# Patient Record
Sex: MALE | Race: WHITE | NOT HISPANIC OR LATINO | Employment: OTHER | URBAN - NONMETROPOLITAN AREA
[De-identification: names, ages, dates, MRNs, and addresses within clinical notes are randomized per-mention and may not be internally consistent; named-entity substitution may affect disease eponyms.]

---

## 2017-04-11 ENCOUNTER — COMMUNICATION - GICH (OUTPATIENT)
Dept: FAMILY MEDICINE | Facility: OTHER | Age: 72
End: 2017-04-11

## 2017-04-11 DIAGNOSIS — I10 ESSENTIAL (PRIMARY) HYPERTENSION: ICD-10-CM

## 2017-05-31 ENCOUNTER — COMMUNICATION - GICH (OUTPATIENT)
Dept: FAMILY MEDICINE | Facility: OTHER | Age: 72
End: 2017-05-31

## 2017-05-31 DIAGNOSIS — F41.9 ANXIETY DISORDER: ICD-10-CM

## 2017-07-10 ENCOUNTER — APPOINTMENT (OUTPATIENT)
Age: 72
Setting detail: DERMATOLOGY
End: 2017-07-12

## 2017-07-10 PROBLEM — C44.311 BASAL CELL CARCINOMA OF SKIN OF NOSE: Status: ACTIVE | Noted: 2017-07-10

## 2017-07-10 PROCEDURE — OTHER MOHS SURGERY PHONE CONSULTATION: OTHER

## 2017-07-10 NOTE — HPI: MOHS SURGERY CONSULTATION
Additional History: Patient will be accompanied by his wife Martine.  Patient has a limp but does not require an assisted walking device.

## 2017-07-10 NOTE — PROCEDURE: MOHS SURGERY PHONE CONSULTATION
Has The Patient Ever Received A Transplant?: No
Has The Pathology Report Been Received?: Yes
Time Of Mohs Surgery: 0900
Date Of Mohs Surgery: 07/11/2017
Office Location Of Mohs Surgery: [facility]
Patient Reported Location: right side of nose
Referring Provider: Dr. Heidi Garcia MD
Detail Level: Detailed

## 2017-07-11 ENCOUNTER — APPOINTMENT (OUTPATIENT)
Age: 72
Setting detail: DERMATOLOGY
End: 2017-07-12

## 2017-07-11 PROBLEM — C44.311 BASAL CELL CARCINOMA OF SKIN OF NOSE: Status: ACTIVE | Noted: 2017-07-11

## 2017-07-11 PROCEDURE — OTHER CONSULTATION FOR MOHS SURGERY: OTHER

## 2017-07-11 PROCEDURE — OTHER MIPS QUALITY: OTHER

## 2017-07-11 PROCEDURE — OTHER MOHS SURGERY: OTHER

## 2017-07-11 PROCEDURE — 17311 MOHS 1 STAGE H/N/HF/G: CPT

## 2017-07-11 NOTE — PROCEDURE: CONSULTATION FOR MOHS SURGERY
Body Location Override (Optional - Billing Will Still Be Based On Selected Body Map Location If Applicable): Right Nasal Ala
X Size Of Lesion In Cm (Optional): 0.5
Name Of The Referring Provider For Procedure: Heidi Garcia MD
Detail Level: Detailed
Incorporate Mauc In Note: Yes
Location Indication Override (Is Already Calculated Based On Selected Body Location): Area H

## 2017-07-11 NOTE — PROCEDURE: MOHS SURGERY
Post-Care Instructions: The patient was provided with detailed verbal and written instructions for daily wound care, including use of H2O2 cleansing, followed by application of plain Vaseline and a bandage.  These instructions including Dr. Obregon's contact information should there be any questions or concerns.  The patient is not to engage in any heavy lifting, exercise, or swimming for the next week.  Should the patient develop any fevers, chills, bleeding, or pain not controlled by OTC analgesics, s/he should contact the office immediately.

## 2017-07-11 NOTE — PROCEDURE: MOHS SURGERY
Body Location Override (Optional - Billing Will Still Be Based On Selected Body Map Location If Applicable): Right Nasal Ala

## 2017-07-11 NOTE — PROCEDURE: MIPS QUALITY
Quality 130: Documentation Of Current Medications In The Medical Record: Current Medications Documented
Detail Level: Detailed
Quality 143: Oncology: Medical And Radiation- Pain Intensity Quantified: Pain severity quantified, no pain present
Quality 226: Preventive Care And Screening: Tobacco Use: Screening And Cessation Intervention: Patient screened for tobacco and is an ex-smoker
Quality 110: Preventive Care And Screening: Influenza Immunization: Influenza Immunization Ordered or Recommended, but not Administered
Quality 110: Preventive Care And Screening: Influenza Immunization: Influenza Immunization Administered during Influenza season
Quality 431: Preventive Care And Screening: Unhealthy Alcohol Use - Screening: Patient screened for unhealthy alcohol use using a single question and scores less than 2 times per year

## 2017-07-15 ENCOUNTER — COMMUNICATION - GICH (OUTPATIENT)
Dept: FAMILY MEDICINE | Facility: OTHER | Age: 72
End: 2017-07-15

## 2017-07-15 DIAGNOSIS — I10 ESSENTIAL (PRIMARY) HYPERTENSION: ICD-10-CM

## 2017-08-17 ENCOUNTER — COMMUNICATION - GICH (OUTPATIENT)
Dept: FAMILY MEDICINE | Facility: OTHER | Age: 72
End: 2017-08-17

## 2017-08-17 DIAGNOSIS — I10 ESSENTIAL (PRIMARY) HYPERTENSION: ICD-10-CM

## 2017-08-27 ENCOUNTER — COMMUNICATION - GICH (OUTPATIENT)
Dept: FAMILY MEDICINE | Facility: OTHER | Age: 72
End: 2017-08-27

## 2017-08-27 DIAGNOSIS — F41.9 ANXIETY DISORDER: ICD-10-CM

## 2017-10-03 ENCOUNTER — HISTORY (OUTPATIENT)
Dept: FAMILY MEDICINE | Facility: OTHER | Age: 72
End: 2017-10-03

## 2017-10-03 ENCOUNTER — OFFICE VISIT - GICH (OUTPATIENT)
Dept: FAMILY MEDICINE | Facility: OTHER | Age: 72
End: 2017-10-03

## 2017-10-03 DIAGNOSIS — A80.9 ACUTE POLIOMYELITIS: ICD-10-CM

## 2017-10-03 DIAGNOSIS — I10 ESSENTIAL (PRIMARY) HYPERTENSION: ICD-10-CM

## 2017-10-03 DIAGNOSIS — Z23 ENCOUNTER FOR IMMUNIZATION: ICD-10-CM

## 2017-10-03 DIAGNOSIS — D12.6 BENIGN NEOPLASM OF COLON: ICD-10-CM

## 2017-10-03 DIAGNOSIS — F41.9 ANXIETY DISORDER: ICD-10-CM

## 2017-10-03 DIAGNOSIS — I49.9 CARDIAC ARRHYTHMIA: ICD-10-CM

## 2017-10-03 ASSESSMENT — ANXIETY QUESTIONNAIRES
5. BEING SO RESTLESS THAT IT IS HARD TO SIT STILL: NOT AT ALL
6. BECOMING EASILY ANNOYED OR IRRITABLE: NOT AT ALL
4. TROUBLE RELAXING: NOT AT ALL
7. FEELING AFRAID AS IF SOMETHING AWFUL MIGHT HAPPEN: NOT AT ALL
GAD7 TOTAL SCORE: 0
1. FEELING NERVOUS, ANXIOUS, OR ON EDGE: NOT AT ALL
3. WORRYING TOO MUCH ABOUT DIFFERENT THINGS: NOT AT ALL
2. NOT BEING ABLE TO STOP OR CONTROL WORRYING: NOT AT ALL

## 2017-10-04 ENCOUNTER — AMBULATORY - GICH (OUTPATIENT)
Dept: LAB | Facility: OTHER | Age: 72
End: 2017-10-04

## 2017-10-04 DIAGNOSIS — D12.6 BENIGN NEOPLASM OF COLON: ICD-10-CM

## 2017-10-04 DIAGNOSIS — I49.9 CARDIAC ARRHYTHMIA: ICD-10-CM

## 2017-10-04 DIAGNOSIS — I10 ESSENTIAL (PRIMARY) HYPERTENSION: ICD-10-CM

## 2017-10-04 LAB
A/G RATIO - HISTORICAL: 1.7 (ref 1–2)
ABSOLUTE BASOPHILS - HISTORICAL: 0.1 THOU/CU MM
ABSOLUTE EOSINOPHILS - HISTORICAL: 0.2 THOU/CU MM
ABSOLUTE IMMATURE GRANULOCYTES(METAS,MYELOS,PROS) - HISTORICAL: 0 THOU/CU MM
ABSOLUTE LYMPHOCYTES - HISTORICAL: 1.8 THOU/CU MM (ref 0.9–2.9)
ABSOLUTE MONOCYTES - HISTORICAL: 0.6 THOU/CU MM
ABSOLUTE NEUTROPHILS - HISTORICAL: 5.9 THOU/CU MM (ref 1.7–7)
ALBUMIN SERPL-MCNC: 4.1 G/DL (ref 3.5–5.7)
ALP SERPL-CCNC: 64 IU/L (ref 34–104)
ALT (SGPT) - HISTORICAL: 11 IU/L (ref 7–52)
ANION GAP - HISTORICAL: 9 (ref 5–18)
AST SERPL-CCNC: 16 IU/L (ref 13–39)
BASOPHILS # BLD AUTO: 0.7 %
BILIRUB SERPL-MCNC: 0.7 MG/DL (ref 0.3–1)
BILIRUB UR QL: NEGATIVE
BUN SERPL-MCNC: 19 MG/DL (ref 7–25)
BUN/CREAT RATIO - HISTORICAL: 20
CALCIUM SERPL-MCNC: 9.1 MG/DL (ref 8.6–10.3)
CHLORIDE SERPLBLD-SCNC: 105 MMOL/L (ref 98–107)
CHOL/HDL RATIO - HISTORICAL: 3.7
CHOLESTEROL TOTAL: 196 MG/DL
CLARITY, URINE: CLEAR CLARITY
CO2 SERPL-SCNC: 25 MMOL/L (ref 21–31)
COLOR UR: YELLOW COLOR
CREAT SERPL-MCNC: 0.94 MG/DL (ref 0.7–1.3)
EOSINOPHIL NFR BLD AUTO: 1.9 %
ERYTHROCYTE [DISTWIDTH] IN BLOOD BY AUTOMATED COUNT: 12.8 % (ref 11.5–15.5)
GFR IF NOT AFRICAN AMERICAN - HISTORICAL: >60 ML/MIN/1.73M2
GLOBULIN - HISTORICAL: 2.4 G/DL (ref 2–3.7)
GLUCOSE SERPL-MCNC: 114 MG/DL (ref 70–105)
GLUCOSE URINE: NEGATIVE MG/DL
HCT VFR BLD AUTO: 42.4 % (ref 37–53)
HDLC SERPL-MCNC: 53 MG/DL (ref 23–92)
HEMOGLOBIN: 14.7 G/DL (ref 13.5–17.5)
IMMATURE GRANULOCYTES(METAS,MYELOS,PROS) - HISTORICAL: 0.4 %
KETONES UR QL: NEGATIVE MG/DL
LDLC SERPL CALC-MCNC: 106 MG/DL
LEUKOCYTE ESTERASE URINE: NEGATIVE
LYMPHOCYTES NFR BLD AUTO: 20.8 % (ref 20–44)
MAGNESIUM SERPL-MCNC: 1.9 MG/DL (ref 1.9–2.7)
MCH RBC QN AUTO: 30.4 PG (ref 26–34)
MCHC RBC AUTO-ENTMCNC: 34.7 G/DL (ref 32–36)
MCV RBC AUTO: 88 FL (ref 80–100)
MONOCYTES NFR BLD AUTO: 6.9 %
NEUTROPHILS NFR BLD AUTO: 69.3 % (ref 42–72)
NITRITE UR QL STRIP: NEGATIVE
NON-HDL CHOLESTEROL - HISTORICAL: 143 MG/DL
OCCULT BLOOD,URINE - HISTORICAL: NEGATIVE
PH UR: 5 [PH]
PLATELET # BLD AUTO: 189 THOU/CU MM (ref 140–440)
PMV BLD: 10 FL (ref 6.5–11)
POTASSIUM SERPL-SCNC: 4 MMOL/L (ref 3.5–5.1)
PROT SERPL-MCNC: 6.5 G/DL (ref 6.4–8.9)
PROTEIN QUALITATIVE,URINE - HISTORICAL: NEGATIVE MG/DL
PROVIDER ORDERDED STATUS - HISTORICAL: ABNORMAL
RED BLOOD COUNT - HISTORICAL: 4.83 MIL/CU MM (ref 4.3–5.9)
SODIUM SERPL-SCNC: 139 MMOL/L (ref 133–143)
SP GR UR STRIP: >=1.03
T4 FREE SERPL-MCNC: 0.83 NG/DL (ref 0.58–1.64)
TRIGL SERPL-MCNC: 186 MG/DL
TSH - HISTORICAL: 1.53 UIU/ML (ref 0.34–5.6)
UROBILINOGEN,QUALITATIVE - HISTORICAL: NORMAL EU/DL
WHITE BLOOD COUNT - HISTORICAL: 8.5 THOU/CU MM (ref 4.5–11)

## 2017-10-06 ENCOUNTER — AMBULATORY - GICH (OUTPATIENT)
Dept: FAMILY MEDICINE | Facility: OTHER | Age: 72
End: 2017-10-06

## 2017-12-01 ENCOUNTER — COMMUNICATION - GICH (OUTPATIENT)
Dept: FAMILY MEDICINE | Facility: OTHER | Age: 72
End: 2017-12-01

## 2017-12-01 DIAGNOSIS — I10 ESSENTIAL (PRIMARY) HYPERTENSION: ICD-10-CM

## 2017-12-27 NOTE — PROGRESS NOTES
Patient Information     Patient Name MRN Mayo Desai [id number] Male 1945      Progress Notes by Zeyad Sorensen MD at 10/3/2017  3:45 PM     Author:  Zeyad Sorensen MD Service:  (none) Author Type:  Physician     Filed:  10/5/2017 12:19 PM Encounter Date:  10/3/2017 Status:  Signed     :  Zeyad Sorensen MD (Physician)            HPI-In today for comprehensive evaluation.  He's been feeling fine. He has no new problems or concerns. Medications remain the same. He denies any particular problems or issues.    Past Medical History:     Diagnosis  Date     Alcohol addiction (HC)     dry for two and a half years in 2004      Anxiety      Basal cell cancer 2015     Decreased libido      Depression      Diverticulosis of sigmoid colon 3/23/2015     POLIOMYELITIS      Prostatism     Mild prostatism      Past Surgical History:      Procedure  Laterality Date     COLONOSCOPY DIAGNOSTIC  3/23/15    F/U 12 months       COLONOSCOPY DIAGNOSTIC  16    F/U 2019       COLONOSCOPY SCREENING  2003    normal       LEG/ANKLE SURGERY      Right leg surgery for polio       [place] SHOULDER ARTHROS[place] SURGERY Bilateral 10/2013    Rotator cuff--had left shoulder done        WRIST FRACTURE [place]      ORIF of wrist fracture       Family History       Problem   Relation Age of Onset     Arthritis  Mother      79, degenerative arthritis of the hips.       Heart Disease  Father       at age 66 of peripheral vascular disease with BK amputations bilaterally.  He was not diabetic, but had high cholesterol and was a smoker.        Good Health  Other      13 siblings, all A & W.  Several treated for depression.       Cancer-breast  Sister      Heart Disease  Brother      Age 52, had MI       Social History      Substance Use Topics        Smoking status:  Former Smoker     Types: Cigarettes     Smokeless tobacco:  Never Used     Alcohol use  No     Allergies      Allergen    Reactions     Banana  *Unknown     Bee Sting [Venom-Honey Bee]  *Unknown     Hazelnuts [Tree Nut]  *Unknown     Kiwi  *Unknown     Peach (Prunus Persica)  *Unknown     Can't eat fresh peach          Current Outpatient Prescriptions:      buPROPion (WELLBUTRIN XL) 300 mg Extended-Release tablet, Take 1 tablet by mouth once daily., Disp: 90 tablet, Rfl: 4     GLUC HCL/GLUC SALDIVAR/[id number]-CJV-Z-GLUC (GLUCOSAMINE COMPLEX ORAL), Take 1 Tab by mouth., Disp: , Rfl:      lisinopril-hydrochlorothiazide (10-12.5 mg) tablet (PRINZIDE; ZESTORETIC), TAKE 1 TABLET BY MOUTH EVERY DAY, Disp: 90 tablet, Rfl: 0     Multivits with Min-FA-Lycopene (ONE-A-DAY MENS) 0.4-600 mg-mcg Tab, Take 1 tablet by mouth once daily., Disp: , Rfl: 0  Medications have been reviewed by me and are current to the best of my knowledge and ability.      ROS:  See HPI:  No recent weight change, fever, chills, or night sweats. Eyes, ENT, cardiopulmonary, GI, , rheumatologic, hematologic, skin, lymph, neurologic, psychiatric and endocrine are all negative. He's having no trouble with his legs from his bout with polio when he was a child.    Physical exam-he is a pleasant, cooperative,  72 y.o. male   in no apparent distress.  HEENT-within normal limits for age.  Neck-supple with no adenopathy, thyromegaly, or bruits.  Good carotid pulses.  Lungs-clear with good air movement.  No rales, rhonchi, or wheezes are heard  Heart-regular rhythm with no murmur or gallop appreciated. He does have quite frequent ectopies.  Abdomen-soft and nontender with no organomegaly or masses, bowel sounds are normal.  No bruits are heard.  Back-no CVA or low back tenderness.  -normal penis and testicles.  No hernia.  No lesions are seen.  Rectal-normal sphincter tone. Empty rectal vault. Normal size prostate with no nodules, tenderness, or asymmetry.  Extremities-no cyanosis, clubbing, or edema.  Normal distal pulses. Mild atrophy is like from polio which is unchanged.  Skin-no  significant lesions or rashes are noted.  Lymphatic-no abnormal nodes are noted   Neurologic-intact and nonfocal.  Psychiatric-alert and oriented.  Normal mood and affect.    Lab-pending  Imaging-no imaging. An EKG reveals a sinus rhythm with frequent PVCs, no acute change    Assessment-preventive healthcare-normal exam. Colonoscopy is up-to-date. Immunizations are up-to-date. Flu vaccine is recommended and given.    Cardiac arrhythmia with PVCs. These are asymptomatic. He is not having any problems on suggested we just observe and follow up again in a month.    Hypertension with good control    History of polio with no significant problems    Anxiety-stable    Plan-laboratory studies done, he'll be notified of the result. We'll plan to follow-up again for the PVCs in one month. He is encouraged to call or return if he develops any symptoms such as chest pain, shortness of breath, palpitations, or syncope.    Zeyad Sorensen MD ....................  10/5/2017   12:14 PM

## 2017-12-28 NOTE — TELEPHONE ENCOUNTER
Patient Information     Patient Name MRN Sex Mayo Garcia [id number] Male 1945      Telephone Encounter by Marnie Parsons RN at 2017 11:19 AM     Author:  Marnie Parsons RN Service:  (none) Author Type:  NURS- Registered Nurse     Filed:  2017 11:27 AM Encounter Date:  2017 Status:  Signed     :  Marnie Parsons RN (NURS- Registered Nurse)            Depression-in adults 18 and over  Office visit in the past 12 months or as indicated in chart.  Should have clinic visit 1-2 months after initial prescription.  Last visit with VANGIE PRATER was on: 2016 - Dx reviewed at this OV.   Next visit with VANGIE PRATER is on: No future appointment listed with this provider  Next visit with Family Practice is on: No future appointment listed in this department  Max refills 12 months from last office visit or per providers notes.  Prescription refilled per RN Medication Refill Policy.................... Marnie Parsons RN ....................  2017   11:26 AM

## 2017-12-28 NOTE — TELEPHONE ENCOUNTER
Patient Information     Patient Name MRN Sex Mayo Garcia [id number] Male 1945      Telephone Encounter by Talia Reynoso RN at 2017  2:18 PM     Author:  Talia Reynoso RN Service:  (none) Author Type:  NURS- Registered Nurse     Filed:  2017  2:24 PM Encounter Date:  7/15/2017 Status:  Signed     :  Talia Reynoso RN (NURS- Registered Nurse)            Diuretic Combinations    Office visit in the past 12 months or per provider note.    Last visit with VANGIE PRATER was on: 2016 in [facility]  Next visit with VANGIE PRATER is on: No future appointment listed with this provider  Next visit with Family Practice is on: No future appointment listed in this department    Lab test requirements:  Creatinine and Potassium annually, if ordering lab, order BMP.  CREATININE (mg/dL)    Date Value   2016 0.97     POTASSIUM (mmol/L)    Date Value   2016 4.0       Max refill for 12 months from last office visit or per provider note.    Overdue for physical.  Phone call to patient with no answer.    Will give one month refill, and send letter (has been sent one letter).  Prescription refilled per RN Medication Refill Policy.................... TALIA REYNOSO RN ....................  2017   2:20 PM

## 2017-12-28 NOTE — PROGRESS NOTES
Patient Information     Patient Name MRN Sex Mayo Burger [id number] Male 1945      Progress Notes signed by Zeyad Sorensen MD at 10/6/2017 11:49 AM      Author:  Zeyad Sorensen MD Service:  (none) Author Type:  Physician     Filed:  10/6/2017 11:49 AM Encounter Date:  10/6/2017 Status:  Signed     :  Zeyad Sorensen MD (Physician)            2017      MAYO MADERA  [address] [place] [id number]      RE: MAYO MADERA  MR#: [id number]  : 1945        Dear Mr. Madera:    I am enclosing a copy of your blood profile.  As you can see, these tests are all normal expect for just a minimal elevation of your blood sugar.  Your blood sugar always runs borderline high and this is about the same as it has been over the last 3 years.      Your cholesterol is doing fine, down from 202 last year.  The minimal elevation of the triglycerides and LDL cholesterol are not at all significant.      We also checked all your electrolytes, magnesium, and thyroid tests because of the extra heartbeats that you showed when we did your EKG.  These were all normal.  Your urine test was normal as well.     As we discussed, we should plan to just see you back in a month or so to recheck the heart rhythm.  At this point, there is nothing that indicates any significant problem.     Sincerely yours,       MD SHARRI ROSARIO/debra  D:10/06/2017 08:04:45  T:10/06/2017 08:41:33  VJID: [id number]  TJID: [id number]    cc:  Enclosure(s):Comp metabolic panel and lipid panel done 10/04/2017

## 2017-12-28 NOTE — TELEPHONE ENCOUNTER
Patient Information     Patient Name MRN Sex Mayo Garcia [id number] Male 1945      Telephone Encounter by Odell Schuler RN at 2017 11:00 AM     Author:  Odell Schuler RN Service:  (none) Author Type:  NURS- Registered Nurse     Filed:  2017 11:03 AM Encounter Date:  2017 Status:  Signed     :  Odell Schuler RN (NURS- Registered Nurse)            Ace Inhibitors    Office visit in the past 12 months or per provider note.    Last visit with ZEYAD PRATER was on: 2016 in [facility]  Next visit with ZEYAD PRATER is on: No future appointment listed with this provider  Next visit with Family Practice is on: No future appointment listed in this department    Lab test requirements:  Creatinine and Potassium annually, if ordering lab, order BMP.  CREATININE (mg/dL)    Date Value   2016 0.97     POTASSIUM (mmol/L)    Date Value   2016 4.0     Patient is overdue for labs.  Will send this to Zeyad Prater MD    Max refill for 12 months from last office visit or per provider note.Unable to complete prescription refill per RN Medication Refill Policy.................... ODELL SCHULER RN ....................  2017   11:01 AM        This is a Refill request from: [facility]  Name of Medication:   zestoretic 10/12.5mg  Quantity requested: 30  Last fill date: 2017  Last visit with ZEYAD PRATER was on: 2016 in [facility]  PCP:  Zeyad Prater MD  Controlled Substance Agreement:  na   Diagnosis r/t this medication request: hypertension

## 2017-12-30 NOTE — NURSING NOTE
Patient Information     Patient Name MRN Sex Mayo Garcia [id number] Male 1945      Nursing Note by Priscila Marks at 10/3/2017  3:45 PM     Author:  Priscila Marks Service:  (none) Author Type:  (none)     Filed:  10/3/2017  4:16 PM Encounter Date:  10/3/2017 Status:  Signed     :  Priscila Marks            Patient is here today for his yearly physical and lab work, he is also wondering about a Pneumonia shot. Priscila Marks LPN......................10/3/2017 4:06 PM

## 2018-01-04 NOTE — TELEPHONE ENCOUNTER
Patient Information     Patient Name MRN Sex Mayo Garcia [id number] Male 1945      Telephone Encounter by Maliha Kenny RN at 2017  2:03 PM     Author:  Maliha Kenny RN Service:  (none) Author Type:  (none)     Filed:  2017  2:05 PM Encounter Date:  2017 Status:  Signed     :  Maliha Kenny RN (NURS- Registered Nurse)            Diuretic Combinations    Office visit in the past 12 months or per provider note.    Last visit with VANGIE PRATER was on: 2016 in [facility]  Next visit with VANGIE PRATER is on: No future appointment listed with this provider  Next visit with Family Practice is on: No future appointment listed in this department    Lab test requirements:  Creatinine and Potassium annually, if ordering lab, order BMP.  CREATININE (mg/dL)    Date Value   2016 0.97     POTASSIUM (mmol/L)    Date Value   2016 4.0       Max refill for 12 months from last office visit or per provider note.    Patient is due for medication management appointment. Limited refill provided at this time and letter sent for reminder to patient. Prescription refilled per RN Medication Refill Policy.................... Maliha Kenny ....................  2017   2:04 PM

## 2018-01-05 NOTE — TELEPHONE ENCOUNTER
Patient Information     Patient Name MRN Sex Mayo Garcia [id number] Male 1945      Telephone Encounter by Maliha Kenny RN at 2017  4:20 PM     Author:  Maliha Kenny RN Service:  (none) Author Type:  NURS- Registered Nurse     Filed:  2017  4:21 PM Encounter Date:  2017 Status:  Signed     :  Maliha Kenny RN (NURS- Registered Nurse)            Depression-in adults 18 and over    Office visit in the past 12 months or as indicated in chart.  Should have clinic visit 1-2 months after initial prescription.    Last visit with VANGIE PRATER was on: 2016 in [facility] [place] [facility]  Next visit with VANGIE PRATER is on: No future appointment listed with this provider  Next visit with Family Practice is on: No future appointment listed in this department    Max refills 12 months from last office visit or per providers notes.    PHQ Depression Screening 3/28/2016 11/3/2016   Date of PHQ exam (doc flow) 3/28/2016 11/3/2016   1. Lack of interest/pleasure 0 - Not at all 0 - Not at all   2. Feeling down/depressed 0 - Not at all 0 - Not at all   PHQ-2 TOTAL SCORE 0 0   3. Trouble sleeping - 0 - Not at all   4. Decreased energy - 0 - Not at all   5. Appetite change - 0 - Not at all   6. Feelings of failure - 0 - Not at all   7. Trouble concentrating - 0 - Not at all   8. Activity level - 0 - Not at all   9. Hurting yourself - 0 - Not at all   PHQ-9 TOTAL SCORE - 0   PHQ-9 Severity Level - none   Functional Impairment - not applicable     Patient is due for medication management appointment. Limited refill provided at this time and letter sent for reminder to patient. Prescription refilled per RN Medication Refill Policy.................... Maliha Kenny RN ....................  2017   4:21 PM

## 2018-01-26 VITALS
HEIGHT: 64 IN | WEIGHT: 153.8 LBS | BODY MASS INDEX: 26.26 KG/M2 | HEART RATE: 76 BPM | DIASTOLIC BLOOD PRESSURE: 80 MMHG | TEMPERATURE: 98.7 F | SYSTOLIC BLOOD PRESSURE: 122 MMHG

## 2018-02-01 ASSESSMENT — ANXIETY QUESTIONNAIRES: GAD7 TOTAL SCORE: 0

## 2018-02-12 ENCOUNTER — DOCUMENTATION ONLY (OUTPATIENT)
Dept: FAMILY MEDICINE | Facility: OTHER | Age: 73
End: 2018-02-12

## 2018-02-12 PROBLEM — A80.9 POLIOMYELITIS: Status: ACTIVE | Noted: 2018-02-12

## 2018-02-12 PROBLEM — F52.8 PSYCHOSEXUAL DYSFUNCTION WITH INHIBITED SEXUAL EXCITEMENT: Status: ACTIVE | Noted: 2018-02-12

## 2018-02-12 RX ORDER — BUPROPION HYDROCHLORIDE 300 MG/1
300 TABLET ORAL DAILY
COMMUNITY
Start: 2017-10-03 | End: 2018-11-29

## 2018-02-12 RX ORDER — HYDROCORTISONE ACETATE 0.5 %
1 CREAM (GRAM) TOPICAL DAILY
COMMUNITY
End: 2018-08-03

## 2018-02-12 RX ORDER — LISINOPRIL/HYDROCHLOROTHIAZIDE 10-12.5 MG
1 TABLET ORAL DAILY
COMMUNITY
Start: 2017-12-01 | End: 2018-11-29

## 2018-02-12 NOTE — TELEPHONE ENCOUNTER
Patient Information     Patient Name MRN Sex Mayo Garcia [id number] Male 1945      Telephone Encounter by Lala Wong RN at 2017  3:44 PM     Author:  Lala Wong RN Service:  (none) Author Type:  NURS- Registered Nurse     Filed:  2017  3:46 PM Encounter Date:  2017 Status:  Signed     :  Lala Wong RN (NURS- Registered Nurse)            Diuretic Combinations    Office visit in the past 12 months or per provider note.    Last visit with VANGIE PRATER was on: 10/03/2017 in [facility]  Next visit with VANGIE PRATER is on: No future appointment listed with this provider  Next visit with Family Practice is on: No future appointment listed in this department    Lab test requirements:  Creatinine and Potassium annually, if ordering lab, order BMP.  CREATININE (mg/dL)    Date Value   10/04/2017 0.94     POTASSIUM (mmol/L)    Date Value   10/04/2017 4.0       Max refill for 12 months from last office visit or per provider note.    Prescription refilled per RN Medication Refill Policy.................... Lala Wong RN ....................  2017   3:45 PM

## 2018-06-01 ENCOUNTER — TRANSFERRED RECORDS (OUTPATIENT)
Dept: HEALTH INFORMATION MANAGEMENT | Facility: OTHER | Age: 73
End: 2018-06-01

## 2018-06-04 ENCOUNTER — TRANSFERRED RECORDS (OUTPATIENT)
Dept: HEALTH INFORMATION MANAGEMENT | Facility: OTHER | Age: 73
End: 2018-06-04

## 2018-06-05 ENCOUNTER — OFFICE VISIT (OUTPATIENT)
Dept: FAMILY MEDICINE | Facility: OTHER | Age: 73
End: 2018-06-05
Attending: NURSE PRACTITIONER
Payer: COMMERCIAL

## 2018-06-05 ENCOUNTER — HOSPITAL ENCOUNTER (OUTPATIENT)
Dept: MRI IMAGING | Facility: OTHER | Age: 73
Discharge: HOME | End: 2018-06-05
Attending: NURSE PRACTITIONER | Admitting: NURSE PRACTITIONER
Payer: COMMERCIAL

## 2018-06-05 VITALS
DIASTOLIC BLOOD PRESSURE: 64 MMHG | SYSTOLIC BLOOD PRESSURE: 120 MMHG | HEIGHT: 64 IN | WEIGHT: 156 LBS | BODY MASS INDEX: 26.63 KG/M2 | HEART RATE: 78 BPM | OXYGEN SATURATION: 97 %
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
## 2018-06-05 DIAGNOSIS — S66.809A: ICD-10-CM

## 2018-06-05 PROCEDURE — G0463 HOSPITAL OUTPT CLINIC VISIT: HCPCS | Mod: 25 | Performed by: NURSE PRACTITIONER

## 2018-06-05 PROCEDURE — 73221 MRI JOINT UPR EXTREM W/O DYE: CPT | Mod: LT

## 2018-06-05 PROCEDURE — 99213 OFFICE O/P EST LOW 20 MIN: CPT | Performed by: NURSE PRACTITIONER

## 2018-06-05 RX ORDER — TRIAMCINOLONE ACETONIDE 5 MG/G
CREAM TOPICAL
Qty: 30 G | Refills: 0 | Status: SHIPPED | OUTPATIENT
Start: 2018-06-05 | End: 2021-10-11

## 2018-06-05 NOTE — MR AVS SNAPSHOT
After Visit Summary   6/5/2018    Mayo Madera    MRN: [id number]           Patient Information     Date Of Birth          1945        Visit Information        Provider Department      6/5/2018 4:45 PM Gloria Briones NP [facility]        Today's Diagnoses     Tick bite, initial encounter    -  1      Care Instructions      Tick Bite (No Antibiotics)    You have been bitten by a tick. Ticks are small arachnids that feed on the blood of rodents, rabbits, birds, deer, dogs, and people. A tick bite may cause a reaction like a spider bite. You may have redness, itching, and slight swelling at the site. Sometimes you may have no reaction where the tick bit you.  Most tick bites are harmless. But some ticks carry diseases, such as Lyme disease or [place] spotted fever. These can be passed to people at the time of the bite. Lyme disease is of greatest concern. Right now you have no symptoms of Lyme disease or other serious reaction to the bite. It is important to watch for the warning signs, which could appear weeks to months after the tick bite.  Home care  The following will help you care for your bite at home:    If itching is a problem, avoid tight clothing and anything that heats up your skin. This includes hot showers or baths and direct sunlight. This will tend to make the itching worse.    An ice pack will reduce local areas of redness and itching. Make your own ice pack by putting ice cubes in a zip-top plastic bag and wrapping it in a thin towel. Creams containing benzocaine will reduce itching. These creams are available over the counter.    You can use an antihistamine with diphenhydramine if your doctor did not give you another antihistamine. This medicine may be used to reduce itching if large areas of the skin are involved. It is available at drugstores and groceries. If symptoms continue, talk with your doctor or pharmacist about over-the-counter  medicines.  Follow-up care  Follow up with your healthcare provider, or as advised.  When to seek medical advice  Call your healthcare provider right away if any of these occur:  Signs of local infection. Watch for these during the next few days.    Increasing redness around the bite site    Increased pain or swelling    Fever over 100.4 F (38.0 C), or as directed by your healthcare provider    Fluid draining from the bite area  Signs of tick-related disease. Watch for these during the next few weeks to months.    Circular, red, ring-like rash appears at the bite area within 1 to 3 weeks    A non-itchy red rash develops on your wrists or ankles and spreads. It may become purple (petechiae).    Red eyes    Tiredness, fever or chills, nausea or vomiting    Neck pain or stiffness, headache, or confusion    Muscle or bone aches    Irregular or rapid heartbeat    Joint pain or swelling, especially in the knee    Numbness, tingling, or weakness in the arms or legs    Weakness on one side of the face  Date Last Reviewed: 10/1/2016    4061-1953 [employer]. [address] [place] [address]. All rights reserved. This information is not intended as a substitute for professional medical care. Always follow your healthcare professional's instructions.                Follow-ups after your visit        Your next 10 appointments already scheduled     Jun 05, 2018  5:50 PM CDT   (Arrive by 5:35 PM)   MR LILIANA TREVIZO W/O CONTRAST with GHMR1   [facility] ([facility])    [address]  [place] [id number] [phone]           Take your medicines as usual, unless your doctor tells you not to. Bring a list of your current medicines to your exam (including vitamins, minerals and over-the-counter drugs). Also bring the results of similar scans you may have had.  Please remove any body piercings and hair extensions before you arrive.  Follow your doctor s  orders. If you do not, we may have to postpone your exam.  You may or may not receive IV contrast for this exam pending the discretion of the Radiologist.  You do not need to do anything special to prepare.  The MRI machine uses a strong magnet. Please wear clothes without metal (snaps, zippers). A sweatsuit works well, or we may give you a hospital gown.   **IMPORTANT** THE INSTRUCTIONS BELOW ARE ONLY FOR THOSE PATIENTS WHO HAVE BEEN PRESCRIBED SEDATION OR GENERAL ANESTHESIA DURING THEIR MRI PROCEDURE:  IF YOUR DOCTOR PRESCRIBED ORAL SEDATION (take medicine to help you relax during your exam):   You must get the medicine from your doctor (oral medication) before you arrive. Bring the medicine to the exam. Do not take it at home. You ll be told when to take it upon arriving for your exam.   Arrive one hour early. Bring someone who can take you home after the test. Your medicine will make you sleepy. After the exam, you may not drive, take a bus or take a taxi by yourself.  IF YOUR DOCTOR PRESCRIBED IV SEDATION:   Arrive one hour early. Bring someone who can take you home after the test. Your medicine will make you sleepy. After the exam, you may not drive, take a bus or take a taxi by yourself.   No eating 6 hours before your exam. You may have clear liquids up until 4 hours before your exam. (Clear liquids include water, clear tea, black coffee and fruit juice without pulp.)  IF YOUR DOCTOR PRESCRIBED ANESTHESIA (be asleep for your exam):   Arrive 1 1/2 hours early. Bring someone who can take you home after the test. You may not drive, take a bus or take a taxi by yourself.   No eating 8 hours before your exam. You may have clear liquids up until 4 hours before your exam. (Clear liquids include water, clear tea, black coffee and fruit juice without pulp.)   You will spend four to five hours in the recovery room.  Please call the Imaging Department at your exam site with any questions.              Future tests that  "were ordered for you today     Open Future Orders        Priority Expected Expires Ordered    MR Hand Left w/o Contrast Routine  2019            Who to contact     If you have questions or need follow up information about today's clinic visit or your schedule please contact [facility] directly at [phone].  Normal or non-critical lab and imaging results will be communicated to you by MyChart, letter or phone within 4 business days after the clinic has received the results. If you do not hear from us within 7 days, please contact the clinic through [employer] or phone. If you have a critical or abnormal lab result, we will notify you by phone as soon as possible.  Submit refill requests through [employer]! or call your pharmacy and they will forward the refill request to us. Please allow 3 business days for your refill to be completed.          Additional Information About Your Visit        [employer] Information     [employer]! lets you send messages to your doctor, view your test results, renew your prescriptions, schedule appointments and more. To sign up, go to [email or web address] . Click on \"Log in\" on the left side of the screen, which will take you to the Welcome page. Then click on \"Sign up Now\" on the right side of the page.     You will be asked to enter the access code listed below, as well as some personal information. Please follow the directions to create your username and password.     Your access code is: [email or web address]  Expires: 9/3/2018  5:30 PM     Your access code will  in 90 days. If you need help or a new code, please call your [facility] clinic or [phone].        Care EveryWhere ID     This is your Care EveryWhere ID. This could be used by other organizations to access your [facility] medical records  [id number]        Your Vitals Were     Pulse Height Pulse Oximetry BMI (Body Mass Index)          78 5' 4\" (1.626 m) 97% 26.78 kg/m2         Blood Pressure from " Last 3 Encounters:   06/05/18 120/64   10/03/17 122/80   11/03/16 114/66    Weight from Last 3 Encounters:   06/05/18 156 lb (70.8 kg)   10/03/17 153 lb 12.8 oz (69.8 kg)   11/03/16 153 lb 9.6 oz (69.7 kg)              Today, you had the following     No orders found for display         Today's Medication Changes          These changes are accurate as of 6/5/18  5:30 PM.  If you have any questions, ask your nurse or doctor.               Start taking these medicines.        Dose/Directions    triamcinolone 0.5 % cream   Commonly known as:  KENALOG   Used for:  Tick bite, initial encounter   Started by:  Gloria Briones NP        Apply sparingly to affected area three times daily.   Quantity:  30 g   Refills:  0            Where to get your medicines      These medications were sent to [id number] - [place], [place] - [address]  [address], [facility] [address]     Phone:  [phone]     triamcinolone 0.5 % cream                Primary Care Provider Office Phone # Fax #    Zeyad Sorensen [phone]       [address] [facility] [address]        Equal Access to Services     CANDELARIO PADILLA AH: Hadii martinez chance hadasho Soomaali, waaxda luqadaha, qaybta kaalmada adeegyada, l[place] drew. So [facility] [phone].    ATENCIÓN: Si habla español, tiene a lopez disposición servicios gratuitos de asistencia lingüística. Llame al [phone].    We comply with applicable federal civil rights laws and [place] laws. We do not discriminate on the basis of race, color, national origin, age, disability, sex, sexual orientation, or gender identity.            Thank you!     Thank you for choosing [facility]  for your care. Our goal is always to provide you with excellent care. Hearing back from our patients is one way we can continue to improve our services. Please take a few minutes to complete the  written survey that you may receive in the mail after your visit with us. Thank you!             Your Updated Medication List - Protect others around you: Learn how to safely use, store and throw away your medicines at www.disposemymeds.org.          This list is accurate as of 6/5/18  5:30 PM.  Always use your most recent med list.                   Brand Name Dispense Instructions for use Diagnosis    buPROPion 300 MG 24 hr tablet    WELLBUTRIN XL     Take 300 mg by mouth daily        glucosamine chondroitin 1500 complex Caps      Take 1 tablet by mouth daily        lisinopril-hydrochlorothiazide 10-12.5 MG per tablet    PRINZIDE/ZESTORETIC     Take 1 tablet by mouth daily        LYCOPENE PO      Take 1 tablet by mouth daily        triamcinolone 0.5 % cream    KENALOG    30 g    Apply sparingly to affected area three times daily.    Tick bite, initial encounter

## 2018-06-05 NOTE — NURSING NOTE
Patient present to clinic today with a tick bite on his upper right back and left upper inner thigh. Picked ticks off over a week ago. Feeling fine, but wants someone to look at the areas.  Silvana Miller CMA..............6/5/2018........5:19 PM

## 2018-06-05 NOTE — PROGRESS NOTES
"Nursing Notes:   Silvana Miller CMA  6/5/2018  5:19 PM  Unsigned  Patient present to clinic today with a tick bite on his upper right back and left upper inner thigh. Picked ticks off over a week ago. Feeling fine, but wants someone to look at the areas.  Silvana Miller [facility]..............6/5/2018........5:19 PM      SUBJECTIVE:   Mayo Madera is a 72 year old male who presents to clinic today for the following health issues:    Wants tick bites looked at.  About 8 days ago he pulled off a wood tick from his upper right back and it wood tick from his left inner thigh.  He denies signs and symptoms of systemic illness no fevers, chills no headaches or body aches.  He would just like somebody to look at the areas today.      Problem list and histories reviewed & adjusted, as indicated.  Additional history: as documented    Current Outpatient Prescriptions   Medication Sig Dispense Refill     buPROPion (WELLBUTRIN XL) 300 MG 24 hr tablet Take 300 mg by mouth daily       Glucosamine-Chondroit-Vit C-Mn (GLUCOSAMINE CHONDROITIN 1500 COMPLEX) CAPS Take 1 tablet by mouth daily       lisinopril-hydrochlorothiazide (PRINZIDE/ZESTORETIC) 10-12.5 MG per tablet Take 1 tablet by mouth daily       LYCOPENE PO Take 1 tablet by mouth daily       triamcinolone (KENALOG) 0.5 % cream Apply sparingly to affected area three times daily. 30 g 0     Allergies   Allergen Reactions     Banana Unknown     Bee Venom Unknown     Kiwi Unknown     Nuts Unknown     Prunus Persica Unknown     Can't eat fresh peach         ROS:  Notable findings in the HPI.       OBJECTIVE:     /64  Pulse 78  Ht 5' 4\" (1.626 m)  Wt 156 lb (70.8 kg)  SpO2 97%  BMI 26.78 kg/m2  Body mass index is 26.78 kg/(m^2).  GENERAL: healthy, alert and no distress  EYES: Eyes grossly normal to inspection  RESP: with ease  SKIN: upper right back and his left inner thigh have red raised papules both measuring about 5 mm in diameter.  There is no surrounding tissue " erythema.  They are not tender to palpation.  There is no drainage or signs or symptoms of infection.    Diagnostic Test Results:  none     ASSESSMENT/PLAN:     1. Tick bite, initial encounter  - triamcinolone (KENALOG) 0.5 % cream; Apply sparingly to affected area three times daily.  Dispense: 30 g; Refill: 0    Medical Decision Making:    Differential Diagnosis:  Insect Bite: Deer tick bite, Woodtick bite and Exagerated local reaction to bite    Serious Comorbid Conditions:  Adult:  None    PLAN:    Bite/Sting:    Ice, Topical steroid cream and Discussed wood tick versus deer tick bites and how they are treated differently.  He is certain that these were wood ticks.  However he will still watch for fevers chills or signs and symptoms of systemic illness if these occur he will follow-up with his primary care provider.    Followup:    If not improving or if condition worsens, follow up with your Primary Care Provider    Disclaimer:  This note consists of words and symbols derived from keyboarding, dictation, or using voice recognition software. As a result, there may be errors in the script that have gone undetected. Please consider this when interpreting information found in this note.      Gloria Briones NP, 6/5/2018 5:24 PM

## 2018-06-05 NOTE — PATIENT INSTRUCTIONS
Tick Bite (No Antibiotics)    You have been bitten by a tick. Ticks are small arachnids that feed on the blood of rodents, rabbits, birds, deer, dogs, and people. A tick bite may cause a reaction like a spider bite. You may have redness, itching, and slight swelling at the site. Sometimes you may have no reaction where the tick bit you.  Most tick bites are harmless. But some ticks carry diseases, such as Lyme disease or [place] spotted fever. These can be passed to people at the time of the bite. Lyme disease is of greatest concern. Right now you have no symptoms of Lyme disease or other serious reaction to the bite. It is important to watch for the warning signs, which could appear weeks to months after the tick bite.  Home care  The following will help you care for your bite at home:    If itching is a problem, avoid tight clothing and anything that heats up your skin. This includes hot showers or baths and direct sunlight. This will tend to make the itching worse.    An ice pack will reduce local areas of redness and itching. Make your own ice pack by putting ice cubes in a zip-top plastic bag and wrapping it in a thin towel. Creams containing benzocaine will reduce itching. These creams are available over the counter.    You can use an antihistamine with diphenhydramine if your doctor did not give you another antihistamine. This medicine may be used to reduce itching if large areas of the skin are involved. It is available at drugstores and groceries. If symptoms continue, talk with your doctor or pharmacist about over-the-counter medicines.  Follow-up care  Follow up with your healthcare provider, or as advised.  When to seek medical advice  Call your healthcare provider right away if any of these occur:  Signs of local infection. Watch for these during the next few days.    Increasing redness around the bite site    Increased pain or swelling    Fever over 100.4 F (38.0 C), or as directed by your  healthcare provider    Fluid draining from the bite area  Signs of tick-related disease. Watch for these during the next few weeks to months.    Circular, red, ring-like rash appears at the bite area within 1 to 3 weeks    A non-itchy red rash develops on your wrists or ankles and spreads. It may become purple (petechiae).    Red eyes    Tiredness, fever or chills, nausea or vomiting    Neck pain or stiffness, headache, or confusion    Muscle or bone aches    Irregular or rapid heartbeat    Joint pain or swelling, especially in the knee    Numbness, tingling, or weakness in the arms or legs    Weakness on one side of the face  Date Last Reviewed: 10/1/2016    1389-4722 [employer]. [address] [place] [address], [place] [address]. All rights reserved. This information is not intended as a substitute for professional medical care. Always follow your healthcare professional's instructions.

## 2018-06-08 ENCOUNTER — TRANSFERRED RECORDS (OUTPATIENT)
Dept: HEALTH INFORMATION MANAGEMENT | Facility: OTHER | Age: 73
End: 2018-06-08

## 2018-06-13 ENCOUNTER — OFFICE VISIT (OUTPATIENT)
Dept: FAMILY MEDICINE | Facility: OTHER | Age: 73
End: 2018-06-13
Attending: NURSE PRACTITIONER
Payer: COMMERCIAL

## 2018-06-13 VITALS
OXYGEN SATURATION: 98 % | DIASTOLIC BLOOD PRESSURE: 60 MMHG | WEIGHT: 155 LBS | SYSTOLIC BLOOD PRESSURE: 112 MMHG | BODY MASS INDEX: 27.46 KG/M2 | HEART RATE: 56 BPM | TEMPERATURE: 97.2 F | HEIGHT: 63 IN

## 2018-06-13 DIAGNOSIS — Z86.12 HISTORY OF POLIOMYELITIS: ICD-10-CM

## 2018-06-13 DIAGNOSIS — I10 ESSENTIAL HYPERTENSION: Primary | ICD-10-CM

## 2018-06-13 DIAGNOSIS — I49.3 ASYMPTOMATIC PVCS: ICD-10-CM

## 2018-06-13 DIAGNOSIS — Z01.818 PREOP EXAMINATION: ICD-10-CM

## 2018-06-13 DIAGNOSIS — F41.1 GAD (GENERALIZED ANXIETY DISORDER): ICD-10-CM

## 2018-06-13 LAB
ANION GAP SERPL CALCULATED.3IONS-SCNC: 7 MMOL/L (ref 3–14)
BUN SERPL-MCNC: 20 MG/DL (ref 7–25)
CALCIUM SERPL-MCNC: 9.6 MG/DL (ref 8.6–10.3)
CHLORIDE SERPL-SCNC: 103 MMOL/L (ref 98–107)
CO2 SERPL-SCNC: 28 MMOL/L (ref 21–31)
CREAT SERPL-MCNC: 1.03 MG/DL (ref 0.7–1.3)
GFR SERPL CREATININE-BSD FRML MDRD: 71 ML/MIN/1.7M2
GLUCOSE SERPL-MCNC: 116 MG/DL (ref 70–105)
POTASSIUM SERPL-SCNC: 3.5 MMOL/L (ref 3.5–5.1)
SODIUM SERPL-SCNC: 138 MMOL/L (ref 134–144)

## 2018-06-13 PROCEDURE — 80048 BASIC METABOLIC PNL TOTAL CA: CPT | Performed by: NURSE PRACTITIONER

## 2018-06-13 PROCEDURE — 36415 COLL VENOUS BLD VENIPUNCTURE: CPT | Performed by: NURSE PRACTITIONER

## 2018-06-13 PROCEDURE — G0463 HOSPITAL OUTPT CLINIC VISIT: HCPCS | Mod: 25

## 2018-06-13 PROCEDURE — 93005 ELECTROCARDIOGRAM TRACING: CPT | Performed by: NURSE PRACTITIONER

## 2018-06-13 PROCEDURE — 93010 ELECTROCARDIOGRAM REPORT: CPT | Performed by: INTERNAL MEDICINE

## 2018-06-13 PROCEDURE — 99214 OFFICE O/P EST MOD 30 MIN: CPT | Mod: 25 | Performed by: NURSE PRACTITIONER

## 2018-06-13 RX ORDER — MV-MINS/FOLIC/LYCOPENE/GINKGO 400-300MCG
1 TABLET ORAL DAILY
COMMUNITY

## 2018-06-13 ASSESSMENT — PAIN SCALES - GENERAL: PAINLEVEL: NO PAIN (0)

## 2018-06-13 NOTE — PROGRESS NOTES
----------------- PREOPERATIVE EXAM ------------------  6/13/2018    SUBJECTIVE:  Mayo Madera is a 72 year old male here for preoperative optimization.    I was asked to see Mayo Madera by Dr. Lemos for preoperative optimization due to Hypertension    Date of Surgery: June 18, 2018  Type of Surgery: Left hand tendon repair  Surgeon: Dr. Lemos  Hospital: [facility]    HPI: Patient reports overall health is been good, denies any cough fever congestion, he is very physically active with lots of walking exercise daily.  Hypertension and anxiety have been under good control on current medications.  Reports a remote past tobacco use history of 5 years, quit 2006.  No cardiac or respiratory condition history.        Fever/Chills or other infectious symptoms in past month:  none  >10lb weight loss in past two months:  none    Patient Active Problem List    Diagnosis Date Noted     Psychosexual dysfunction with inhibited sexual excitement 02/12/2018     Priority: Medium     Poliomyelitis 02/12/2018     Priority: Medium     Anxiety 03/28/2016     Priority: Medium     Tubulovillous adenoma of colon 03/28/2016     Priority: Medium     Essential hypertension 04/25/2011     Priority: Medium       Past Medical History:   Diagnosis Date     Acute poliomyelitis     No Comments Provided     Anxiety disorder     02/07     Basal cell carcinoma of skin     1/8/2015     Decreased libido     No Comments Provided     Diverticulosis of large intestine without perforation or abscess without bleeding     3/23/2015     Enlarged prostate without lower urinary tract symptoms (luts)     02/07,Mild prostatism     Major depressive disorder, single episode     02/07     Uncomplicated alcohol dependence (H)     dry for two and a half years in July of 2004       Past Surgical History:   Procedure Laterality Date     ANKLE SURGERY      Right leg surgery for polio     COLONOSCOPY      02/04/2003,normal      COLONOSCOPY      3/23/15,F/U 12 months     COLONOSCOPY      16,F/U 2019     OTHER SURGICAL HISTORY      [id number],WRIST FRACTURE TX,ORIF of wrist fracture     OTHER SURGICAL HISTORY      10/2013,74384.0,FL SHOULDER ARTHROSCOPY SURGERY,Bilateral,Rotator cuff--had left shoulder done        Family History   Problem Relation Age of Onset     Arthritis Mother      Arthritis,79, degenerative arthritis of the hips.     HEART DISEASE Father      Heart Disease, at age 66 of peripheral vascular disease with BK amputations bilaterally.  He was not diabetic, but had high cholesterol and was a smoker.     Family History Negative Other      Good Health,13 siblings, all A & W.  Several treated for depression.     Breast Cancer Sister      Cancer-breast     HEART DISEASE Brother      Heart Disease,Age 52, had MI       Social History   Substance Use Topics     Smoking status: Former Smoker     Types: Cigarettes     Smokeless tobacco: Never Used     Alcohol use No       Current Outpatient Prescriptions   Medication Sig Dispense Refill     buPROPion (WELLBUTRIN XL) 300 MG 24 hr tablet Take 300 mg by mouth daily       Glucosamine-Chondroit-Vit C-Mn (GLUCOSAMINE CHONDROITIN 1500 COMPLEX) CAPS Take 1 tablet by mouth daily       lisinopril-hydrochlorothiazide (PRINZIDE/ZESTORETIC) 10-12.5 MG per tablet Take 1 tablet by mouth daily       Multiple Vitamins-Minerals (ONE-A-DAY MENS 50+ ADVANTAGE) TABS Take 1 tablet by mouth daily       triamcinolone (KENALOG) 0.5 % cream Apply sparingly to affected area three times daily. 30 g 0       Allergies:  Allergies   Allergen Reactions     Banana Unknown     Bee Venom Unknown     Kiwi Unknown     Nuts Unknown     Prunus Persica Unknown     Can't eat fresh peach       ROS:    Surgical:  patient denies previous complications from prior surgeries including but not limited to prolonged bleeding, anesthesia complications, dysrhythmias, surgical wound infections, or prolonged hospital  "stay.    Denies family hx of bleeding tendencies, anesthesia complications, or other problems with surgery.     -------------------------------------------------------------    PHYSICAL EXAM:  /60 (BP Location: Right arm, Patient Position: Sitting, Cuff Size: Adult Large)  Pulse 56  Temp 97.2  F (36.2  C) (Tympanic)  Ht 5' 3.39\" (1.61 m)  Wt 155 lb (70.3 kg)  SpO2 98%  BMI 27.12 kg/m2    EXAM:  General Appearance: Pleasant, alert, appropriate appearance for age. No acute distress  Head Exam: Normal. Normocephalic, atraumatic.  Eyes: PERRL, EOMI  Ears: Normal TM's bilaterally. Normal auditory canals and external ears.   OroPharynx: Normal buccal mucosa. Normal pharynx.  Neck: Supple, no masses or nodes, no lymphadenopathy.  No thyromegaly.  Lungs: Normal chest wall and respirations. Clear to auscultation, no wheezes or crackles.  Cardiovascular: Regular rate and rhythm. S1, S2, no murmurs.  Gastrointestinal: Soft, nontender, no abnormal masses or organomegaly. BS normal   Musculoskeletal: No edema.  Skin: no concerning or new rashes.  Neurologic Exam: CN 2-12 grossly intact.  Normal gait.  Symmetric DTRs, normal gross motor movement, tone, and coordination. No tremor.  Psychiatric Exam: Alert and oriented, appropriate affect.      EKG:  See attached  ---------------------------------------------------------------  LABS  Results for orders placed or performed during the hospital encounter of 06/05/18   MR Hand Left w/o Contrast    Narrative    PROCEDURE: MR HAND LEFT W/O CONTRAST 6/5/2018 6:39 PM    HISTORY: ; Injury of flexor tendon of hand    COMPARISONS: None.    Meds/Dose Given: None.    TECHNIQUE: Sagittal, coronal and axial images with combination of  proton density, T2 and T1-weighted sequences.    FINDINGS: Only a single intact tendon is seen along the flexor side of  the small finger. This intact tendon inserts on the base of the distal  phalanx and therefore appears to be the profundus tendon. No " separate  superficial tendon is visualized in the finger. There is some fluid  and edema in the soft tissues adjacent to the intact flexor tendon at  the level of the metacarpal heads and this may be the site of injury  with proximal retraction of the tendon from this level. The tendon  stump is possibly visualized at the level of the mid fifth metacarpal.    Superficial and deep tendons of the flexor side of ring, index and  long finger all appear intact.    No extensor tendon abnormality is seen.    There are degenerative changes in the carpus. Prominent subchondral  cysts are seen within the capitate and scaphoid in particular.  Proximal cysts are also seen in the triquetrum exam. There is moderate  degenerative change in the first carpal metacarpal joint.    Carpal canal has a fairly normal appearance.         Impression    IMPRESSION:   1. Intact flexor tendon to the small finger which inserts on the base  of the distal phalanx and is therefore probably the profundus tendon.  2. Nonvisualization of a separate superficial tendon with edema at the  level of the mid to distal metacarpals which is probably the site of  injury. The tendon stump is possibly visualized at the level of the  mid metacarpal.    MAURICE FRASER MD       ASSESSEMENT AND PLAN:      Ready and optimized for schedule procedure    (I49.3) Asymptomatic PAC's  Comment:   Plan: EKG 12-lead, tracing only            (I10) Essential hypertension  (primary encounter diagnosis)  Comment: Stable  Plan: Basic metabolic panel          (Z01.818) Preop examination      (F41.1) DAISY (generalized anxiety disorder)      (Z86.12) History of poliomyelitis          PRE OP RECOMMENDATIONS:  Patient is on chronic pain medications  None  Patient is on antiplatlet/anticoagulation medication None  Other medications that need adjustment perioperatively None    Other:  Patient was advised to call our office and the surgical services with any change in condition or new  symptoms if they were to develop between today and their surgical date.  Especially any cardiopulmonary symptoms or symptoms concerning for an infection.    Ivon Ovalle 6/13/2018

## 2018-06-13 NOTE — MR AVS SNAPSHOT
"              After Visit Summary   6/13/2018    Mayo Madera    MRN: [id number]           Patient Information     Date Of Birth          1945        Visit Information        Provider Department      6/13/2018 8:45 AM Ivon Ovalle APRN CNP [facility]        Today's Diagnoses     Asymptomatic PVCs    -  1    Essential hypertension          Care Instructions    Dr Lemos office will call for specific directions and instructions before procedure    You will take lisinopril and wellbutrin with enough sips to get it down    No ibuprofen, Aleve or aspirin or vitamins until after surgery    Tylenol is always fine    I will call you [employer] number if needed    Call Dr Lemos office for instructions          Follow-ups after your visit        Follow-up notes from your care team     Return for as directed by surgery.      Who to contact     If you have questions or need follow up information about today's clinic visit or your schedule please contact [facility] directly at [phone].  Normal or non-critical lab and imaging results will be communicated to you by [employer], letter or phone within 4 business days after the clinic has received the results. If you do not hear from us within 7 days, please contact the clinic through [employer] or phone. If you have a critical or abnormal lab result, we will notify you by phone as soon as possible.  Submit refill requests through [employer] or call your pharmacy and they will forward the refill request to us. Please allow 3 business days for your refill to be completed.          Additional Information About Your Visit        [employer] Information     [employer] lets you send messages to your doctor, view your test results, renew your prescriptions, schedule appointments and more. To sign up, go to [email or web address] . Click on \"Log in\" on the left side of the screen, which will take you to the Welcome page. Then click " "on \"Sign up Now\" on the right side of the page.     You will be asked to enter the access code listed below, as well as some personal information. Please follow the directions to create your username and password.     Your access code is: [email or web address]  Expires: 9/3/2018  5:30 PM     Your access code will  in 90 days. If you need help or a new code, please call your [facility] clinic or [phone].        Care EveryWhere ID     This is your Care EveryWhere ID. This could be used by other organizations to access your [facility] medical records  [id number]        Your Vitals Were     Pulse Temperature Height Pulse Oximetry BMI (Body Mass Index)       56 97.2  F (36.2  C) (Tympanic) 5' 3.39\" (1.61 m) 98% 27.12 kg/m2        Blood Pressure from Last 3 Encounters:   18 112/60   18 120/64   10/03/17 122/80    Weight from Last 3 Encounters:   18 155 lb (70.3 kg)   18 156 lb (70.8 kg)   10/03/17 153 lb 12.8 oz (69.8 kg)              We Performed the Following     EKG 12-lead, tracing only        Primary Care Provider Office Phone # Fax #    Zeyad Sorensen [phone] [address] [facility] [address]        Equal Access to Services     [facility]: Hadii aad ku hadasho Soconorali, waaxda luqadaha, qaybta kaalmada adeegyada, london paredes . So [facility] [phone].    ATENCIÓN: Si habla español, tiene a lopez disposición servicios gratuitos de asistencia lingüística. Llame al [phone].    We comply with applicable federal civil rights laws and [place] laws. We do not discriminate on the basis of race, color, national origin, age, disability, sex, sexual orientation, or gender identity.            Thank you!     Thank you for choosing [facility] AND [facility]  for your care. Our goal is always to provide you with excellent care. Hearing back from our patients is one way we can continue to improve our services. Please take a " few minutes to complete the written survey that you may receive in the mail after your visit with us. Thank you!             Your Updated Medication List - Protect others around you: Learn how to safely use, store and throw away your medicines at www.disposemymeds.org.          This list is accurate as of 6/13/18  9:35 AM.  Always use your most recent med list.                   Brand Name Dispense Instructions for use Diagnosis    buPROPion 300 MG 24 hr tablet    WELLBUTRIN XL     Take 300 mg by mouth daily        glucosamine chondroitin 1500 complex Caps      Take 1 tablet by mouth daily        lisinopril-hydrochlorothiazide 10-12.5 MG per tablet    PRINZIDE/ZESTORETIC     Take 1 tablet by mouth daily        ONE-A-DAY MENS 50+ ADVANTAGE Tabs      Take 1 tablet by mouth daily        triamcinolone 0.5 % cream    KENALOG    30 g    Apply sparingly to affected area three times daily.    Tick bite, initial encounter

## 2018-06-13 NOTE — NURSING NOTE
"Date of Surgery: 6/18   Type of Surgery: Left hand 5th finger tendon repair   Surgeon: Dr. Lemos   Hospital:  [facility]   Fax: na    Fever/Chills or other infectious symptoms in past month: no  >10lb weight loss in past two months: no  O2 SAT: 98    Health Care Directive/Code status:  Not on file  Hx of blood transfusions:   no  Td up to date:  yes  History of VRE/MRSA:  no Date: na    Preoperative Evaluation: Obstructive Sleep Apnea screening    S: Snore -  Do you snore loudly? (louder than talking or loud enough to be heard through closed doors)no  T: Tired - Do you often feel tired, fatigued, or sleepy during the daytime?no  O: Observed - Has anyone ever observed you stop breathing during your sleep?no  P: Pressure - Do you have or are you being treated for high blood pressure?yes  B: BMI - BMI greater than 35kg/m2?no  A: Age - Age over 50 years old?yes  N: Neck - Neck circumference greater than 40 cm?no  G: Gender - Gender: Male?no    Total number of \"YES\" responses:  2    Scoring: Low risk of MACY 0-2  At Risk of MACY: >3 High Risk of MACY: 5-8    Gilma Miranda 6/13/2018 8:53 AM    "

## 2018-06-13 NOTE — PATIENT INSTRUCTIONS
Dr Lemos office will call for specific directions and instructions before procedure    You will take lisinopril and wellbutrin with enough sips to get it down    No ibuprofen, Aleve or aspirin or vitamins until after surgery    Tylenol is always fine    I will call you onyour mobile number if needed    Call Dr Lemos office for instructions

## 2018-06-13 NOTE — Clinical Note
Please send preoperative note, EKG with final interpretation, labs to [facility] for June 18 scheduled hand surgery with Dr. Lemos

## 2018-06-26 ENCOUNTER — TRANSFERRED RECORDS (OUTPATIENT)
Dept: HEALTH INFORMATION MANAGEMENT | Facility: OTHER | Age: 73
End: 2018-06-26

## 2018-07-06 ENCOUNTER — TRANSFERRED RECORDS (OUTPATIENT)
Dept: HEALTH INFORMATION MANAGEMENT | Facility: OTHER | Age: 73
End: 2018-07-06

## 2018-07-23 NOTE — PROGRESS NOTES
Patient Information     Patient Name  Mayo Madera MRN  [id number] Sex  Male   1945      Letter by Zeyad Sorensen MD at      Author:  Zeyad Sorensen MD Service:  (none) Author Type:  (none)    Filed:   Encounter Date:  2017 Status:  (Other)           Mayo Madera  [address]  [facility] [address]          2017    Dear Mr. Madera:    This letter is to remind you that you are due for your annual exam with Zeyad Sorensen MD . Your last comprehensive medication visit was more than 12 months ago (3/28/2016).     A LIMITED refill of lisinopril-hydrochlorothiazide (10-12.5 mg) tablet (PRINZIDE; ZESTORETIC) has been called into your pharmacy.    Additional refills require an annual medication management appointment with Zeyad Sorensen MD. Please call the clinic at [phone] to schedule your appointment.    Please call to inform us if you no longer see Zeyad Sorensen MD for primary care, doing so will remove you from our call/contact list.    Thank you,    The Refill Nurse  [facility]

## 2018-07-24 NOTE — PROGRESS NOTES
Patient Information     Patient Name  Mayo Madera MRN  [id number] Sex  Male   1945      Letter by Zeyad Sorensen MD at      Author:  Zeyad Sorensen MD Service:  (none) Author Type:  (none)    Filed:   Encounter Date:  7/15/2017 Status:  (Other)           Mayo Madera  [address] [place] [id number]          2017    Dear Mr. Madera:    A refill of lisinopril-hydrochlorothiazide (10-12.5 mg) tablet (PRINZIDE; ZESTORETIC) has been called into your pharmacy.    Additional refills require an office visit with Zeyad Sorensen MD.   Please call the clinic at [phone] to schedule your appointment.    If you should require additional refills before your scheduled appointment, please contact your pharmacy and we will refill your medication until that date.      Thank you,    The Refill Nurse  [facility]

## 2018-07-24 NOTE — PROGRESS NOTES
Patient Information     Patient Name  Mayo Madera MRN  [id number] Sex  Male   1945      Letter by Zeyad Sorensen MD at      Author:  Zeyad Sorensen MD Service:  (none) Author Type:  (none)    Filed:   Encounter Date:  2017 Status:  (Other)           Mayo Madera  [address] [facility] [address]          May 31, 2017    Dear Mr. Madera:    This letter is to remind you that you are due for your annual exam with Zeyad Sorensen MD . Your last comprehensive medication visit was more than 12 months ago.     A LIMITED refill of buPROPion (WELLBUTRIN XL) 300 mg Extended-Release tablet has been called into your pharmacy.    Additional refills require an annual medication management appointment with Zeyad Sorensen MD. Please call the clinic at [phone] to schedule your appointment.    Please call to inform us if you no longer see Zeyad Sorensen MD for primary care, doing so will remove you from our call/contact list.    Thank you,    The Refill Nurse  [facility]

## 2018-08-03 ENCOUNTER — TRANSFERRED RECORDS (OUTPATIENT)
Dept: HEALTH INFORMATION MANAGEMENT | Facility: OTHER | Age: 73
End: 2018-08-03

## 2018-08-03 ENCOUNTER — OFFICE VISIT (OUTPATIENT)
Dept: ORTHOPEDICS | Facility: OTHER | Age: 73
End: 2018-08-03
Attending: SPECIALIST
Payer: COMMERCIAL

## 2018-08-03 VITALS
WEIGHT: 155 LBS | BODY MASS INDEX: 27.12 KG/M2 | SYSTOLIC BLOOD PRESSURE: 122 MMHG | HEART RATE: 82 BPM | DIASTOLIC BLOOD PRESSURE: 70 MMHG | TEMPERATURE: 96 F

## 2018-08-03 DIAGNOSIS — Z00.00 ROUTINE GENERAL MEDICAL EXAMINATION AT A HEALTH CARE FACILITY: Primary | ICD-10-CM

## 2018-08-03 PROCEDURE — G0463 HOSPITAL OUTPT CLINIC VISIT: HCPCS

## 2018-08-03 ASSESSMENT — PAIN SCALES - GENERAL: PAINLEVEL: NO PAIN (0)

## 2018-08-03 NOTE — MR AVS SNAPSHOT
"              After Visit Summary   8/3/2018    Mayo Madera    MRN: [id number]           Patient Information     Date Of Birth          1945        Visit Information        Provider Department      8/3/2018 8:15 AM Gomez Lemos MD [facility]        Today's Diagnoses     Routine general medical examination at a health care facility    -  1       Follow-ups after your visit        Your next 10 appointments already scheduled     Aug 31, 2018  8:30 AM CDT   Return Visit with Gomez Lemos MD   [facility] ([facility])    [address]  [place] [id number] [phone]              Who to contact     If you have questions or need follow up information about today's clinic visit or your schedule please contact [facility] directly at [phone].  Normal or non-critical lab and imaging results will be communicated to you by [employer], letter or phone within 4 business days after the clinic has received the results. If you do not hear from us within 7 days, please contact the clinic through [employer] or phone. If you have a critical or abnormal lab result, we will notify you by phone as soon as possible.  Submit refill requests through [employer] or call your pharmacy and they will forward the refill request to us. Please allow 3 business days for your refill to be completed.          Additional Information About Your Visit        MyChart Information     [employer] lets you send messages to your doctor, view your test results, renew your prescriptions, schedule appointments and more. To sign up, go to [email or web address] . Click on \"Log in\" on the left side of the screen, which will take you to the Welcome page. Then click on \"Sign up Now\" on the right side of the page.     You will be asked to enter the access code listed below, as well as some personal information. Please follow the directions to create " your username and password.     Your access code is: [email or web address]  Expires: 9/3/2018  5:30 PM     Your access code will  in 90 days. If you need help or a new code, please call your [facility] or [phone].        Care EveryWhere ID     This is your Care EveryWhere ID. This could be used by other organizations to access your [facility] medical records  [id number]        Your Vitals Were     Pulse Temperature BMI (Body Mass Index)             82 96  F (35.6  C) 27.12 kg/m2          Blood Pressure from Last 3 Encounters:   18 122/70   18 112/60   18 120/64    Weight from Last 3 Encounters:   18 70.3 kg (155 lb)   18 70.3 kg (155 lb)   18 70.8 kg (156 lb)              Today, you had the following     No orders found for display       Primary Care Provider Office Phone # Fax #    Zeyad Sorensen [phone]       [address] [facility] [address]        Equal Access to Services     [facility]: Hadii aad ku hadasho Soomaali, waaxda luqadaha, qaybta kaalmada adetabithayagerard, london paredes . So [facility] [phone].    ATENCIÓN: Si habla español, tiene a lopez disposición servicios gratuitos de asistencia lingüística. Llame al [phone].    We comply with applicable federal civil rights laws and [place] laws. We do not discriminate on the basis of race, color, national origin, age, disability, sex, sexual orientation, or gender identity.            Thank you!     Thank you for choosing [facility] AND [facility]  for your care. Our goal is always to provide you with excellent care. Hearing back from our patients is one way we can continue to improve our services. Please take a few minutes to complete the written survey that you may receive in the mail after your visit with us. Thank you!             Your Updated Medication List - Protect others around you: Learn how to safely use, store and throw away your  medicines at www.disposemymeds.org.          This list is accurate as of 8/3/18 11:59 PM.  Always use your most recent med list.                   Brand Name Dispense Instructions for use Diagnosis    buPROPion 300 MG 24 hr tablet    WELLBUTRIN XL     Take 300 mg by mouth daily        lisinopril-hydrochlorothiazide 10-12.5 MG per tablet    PRINZIDE/ZESTORETIC     Take 1 tablet by mouth daily        ONE-A-DAY MENS 50+ ADVANTAGE Tabs      Take 1 tablet by mouth daily        triamcinolone 0.5 % cream    KENALOG    30 g    Apply sparingly to affected area three times daily.    Tick bite, initial encounter

## 2018-08-03 NOTE — NURSING NOTE
Patient presents to clinic for post-op check on left hand.     Vaishali Dudley LPN....................  8/3/2018   8:30 AM

## 2018-08-17 ENCOUNTER — TRANSFERRED RECORDS (OUTPATIENT)
Dept: HEALTH INFORMATION MANAGEMENT | Facility: OTHER | Age: 73
End: 2018-08-17

## 2018-08-17 ENCOUNTER — OFFICE VISIT (OUTPATIENT)
Dept: ORTHOPEDICS | Facility: OTHER | Age: 73
End: 2018-08-17
Attending: SPECIALIST
Payer: COMMERCIAL

## 2018-08-17 DIAGNOSIS — Z00.00 ROUTINE GENERAL MEDICAL EXAMINATION AT A HEALTH CARE FACILITY: Primary | ICD-10-CM

## 2018-08-17 PROCEDURE — G0463 HOSPITAL OUTPT CLINIC VISIT: HCPCS

## 2018-08-17 NOTE — MR AVS SNAPSHOT
"              After Visit Summary   8/17/2018    Mayo Madera    MRN: [id number]           Patient Information     Date Of Birth          1945        Visit Information        Provider Department      8/17/2018 8:30 AM Gomez Lemos MD [facility]        Today's Diagnoses     Routine general medical examination at a health care facility    -  1       Follow-ups after your visit        Your next 10 appointments already scheduled     Sep 21, 2018  2:00 PM CDT   Return Visit with Gomez Lemos MD   [facility] ([facility])    [address]  [place] [id number] [phone]              Who to contact     If you have questions or need follow up information about today's clinic visit or your schedule please contact [facility] directly at [phone].  Normal or non-critical lab and imaging results will be communicated to you by Landis+Gyrhart, letter or phone within 4 business days after the clinic has received the results. If you do not hear from us within 7 days, please contact the clinic through Landis+Gyrhart or phone. If you have a critical or abnormal lab result, we will notify you by phone as soon as possible.  Submit refill requests through Lookback or call your pharmacy and they will forward the refill request to us. Please allow 3 business days for your refill to be completed.          Additional Information About Your Visit        MyChart Information     Lookback lets you send messages to your doctor, view your test results, renew your prescriptions, schedule appointments and more. To sign up, go to [email or web address] . Click on \"Log in\" on the left side of the screen, which will take you to the Welcome page. Then click on \"Sign up Now\" on the right side of the page.     You will be asked to enter the access code listed below, as well as some personal information. Please follow the directions to create " your username and password.     Your access code is: [email or web address]  Expires: 9/3/2018  5:30 PM     Your access code will  in 90 days. If you need help or a new code, please call your [facility] or [phone].        Care EveryWhere ID     This is your Care EveryWhere ID. This could be used by other organizations to access your [place] medical records  [id number]         Blood Pressure from Last 3 Encounters:   18 122/70   18 112/60   18 120/64    Weight from Last 3 Encounters:   18 70.3 kg (155 lb)   18 70.3 kg (155 lb)   18 70.8 kg (156 lb)              Today, you had the following     No orders found for display       Primary Care Provider Office Phone # Fax #    Zeyad Sorensen [phone]       [address] [facility] [address]        Equal Access to Services     [facility]: Hadii aad ku hadasho Soomaali, waaxda luqadaha, qaybta kaalmada adeegyada, waxay silver paredes . So [facility] [phone].    ATENCIÓN: Si habla español, tiene a lopez disposición servicios gratuitos de asistencia lingüística. Llame al [phone].    We comply with applicable federal civil rights laws and [place] laws. We do not discriminate on the basis of race, color, national origin, age, disability, sex, sexual orientation, or gender identity.            Thank you!     Thank you for choosing [facility] AND [facility]  for your care. Our goal is always to provide you with excellent care. Hearing back from our patients is one way we can continue to improve our services. Please take a few minutes to complete the written survey that you may receive in the mail after your visit with us. Thank you!             Your Updated Medication List - Protect others around you: Learn how to safely use, store and throw away your medicines at www.disposemymeds.org.          This list is accurate as of 18 11:59 PM.  Always use your most  recent med list.                   Brand Name Dispense Instructions for use Diagnosis    buPROPion 300 MG 24 hr tablet    WELLBUTRIN XL     Take 300 mg by mouth daily        lisinopril-hydrochlorothiazide 10-12.5 MG per tablet    PRINZIDE/ZESTORETIC     Take 1 tablet by mouth daily        ONE-A-DAY MENS 50+ ADVANTAGE Tabs      Take 1 tablet by mouth daily        triamcinolone 0.5 % cream    KENALOG    30 g    Apply sparingly to affected area three times daily.    Tick bite, initial encounter

## 2018-09-21 ENCOUNTER — OFFICE VISIT (OUTPATIENT)
Dept: ORTHOPEDICS | Facility: OTHER | Age: 73
End: 2018-09-21
Attending: SPECIALIST
Payer: COMMERCIAL

## 2018-09-21 DIAGNOSIS — Z00.00 ROUTINE GENERAL MEDICAL EXAMINATION AT A HEALTH CARE FACILITY: Primary | ICD-10-CM

## 2018-09-21 PROCEDURE — G0463 HOSPITAL OUTPT CLINIC VISIT: HCPCS

## 2018-09-21 NOTE — MR AVS SNAPSHOT
"              After Visit Summary   9/21/2018    Mayo Madera    MRN: [id number]           Patient Information     Date Of Birth          1945        Visit Information        Provider Department      9/21/2018 2:00 PM Gomez Lemos MD [facility]        Today's Diagnoses     Routine general medical examination at a health care facility    -  1       Follow-ups after your visit        Your next 10 appointments already scheduled     Oct 19, 2018  2:45 PM CDT   Return Visit with Gomez Lemos MD   [facility] ([facility])    [address]  [place] [id number] [phone]              Who to contact     If you have questions or need follow up information about today's clinic visit or your schedule please contact [facility] directly at [phone].  Normal or non-critical lab and imaging results will be communicated to you by Gotcha Ninjashart, letter or phone within 4 business days after the clinic has received the results. If you do not hear from us within 7 days, please contact the clinic through Gotcha Ninjashart or phone. If you have a critical or abnormal lab result, we will notify you by phone as soon as possible.  Submit refill requests through Ultriva or call your pharmacy and they will forward the refill request to us. Please allow 3 business days for your refill to be completed.          Additional Information About Your Visit        MyChart Information     Ultriva lets you send messages to your doctor, view your test results, renew your prescriptions, schedule appointments and more. To sign up, go to [email or web address] . Click on \"Log in\" on the left side of the screen, which will take you to the Welcome page. Then click on \"Sign up Now\" on the right side of the page.     You will be asked to enter the access code listed below, as well as some personal information. Please follow the directions to create " your username and password.     Your access code is: [email or web address]  Expires: 2018 11:24 AM     Your access code will  in 90 days. If you need help or a new code, please call your [facility] or [phone].        Care EveryWhere ID     This is your Care EveryWhere ID. This could be used by other organizations to access your [place] medical records  [id number]         Blood Pressure from Last 3 Encounters:   18 122/70   18 112/60   18 120/64    Weight from Last 3 Encounters:   18 70.3 kg (155 lb)   18 70.3 kg (155 lb)   18 70.8 kg (156 lb)              Today, you had the following     No orders found for display       Primary Care Provider Office Phone # Fax #    Zeyad Sorensen [phone]       [address] [facility] [address]        Equal Access to Services     [facility]: Hadii aad ku hadasho Soomaali, waaxda luqadaha, qaybta kaalmada adeegyada, waxay silver haykael paredes . So [facility] [phone].    ATENCIÓN: Si habla español, tiene a lopez disposición servicios gratuitos de asistencia lingüística. Llame al [phone].    We comply with applicable federal civil rights laws and [place] laws. We do not discriminate on the basis of race, color, national origin, age, disability, sex, sexual orientation, or gender identity.            Thank you!     Thank you for choosing [facility] AND [facility]  for your care. Our goal is always to provide you with excellent care. Hearing back from our patients is one way we can continue to improve our services. Please take a few minutes to complete the written survey that you may receive in the mail after your visit with us. Thank you!             Your Updated Medication List - Protect others around you: Learn how to safely use, store and throw away your medicines at www.disposemymeds.org.          This list is accurate as of 18 11:59 PM.  Always use your most  recent med list.                   Brand Name Dispense Instructions for use Diagnosis    buPROPion 300 MG 24 hr tablet    WELLBUTRIN XL     Take 300 mg by mouth daily        lisinopril-hydrochlorothiazide 10-12.5 MG per tablet    PRINZIDE/ZESTORETIC     Take 1 tablet by mouth daily        ONE-A-DAY MENS 50+ ADVANTAGE Tabs      Take 1 tablet by mouth daily        triamcinolone 0.5 % cream    KENALOG    30 g    Apply sparingly to affected area three times daily.    Tick bite, initial encounter

## 2018-10-19 ENCOUNTER — OFFICE VISIT (OUTPATIENT)
Dept: ORTHOPEDICS | Facility: OTHER | Age: 73
End: 2018-10-19
Attending: SPECIALIST
Payer: COMMERCIAL

## 2018-10-19 DIAGNOSIS — Z00.00 ROUTINE GENERAL MEDICAL EXAMINATION AT A HEALTH CARE FACILITY: Primary | ICD-10-CM

## 2018-10-19 PROCEDURE — G0463 HOSPITAL OUTPT CLINIC VISIT: HCPCS

## 2018-10-19 NOTE — MR AVS SNAPSHOT
"              After Visit Summary   10/19/2018    Mayo Madera    MRN: [id number]           Patient Information     Date Of Birth          1945        Visit Information        Provider Department      10/19/2018 2:45 PM Gomez Lemos MD [facility]        Today's Diagnoses     Routine general medical examination at a health care facility    -  1       Follow-ups after your visit        Who to contact     If you have questions or need follow up information about today's clinic visit or your schedule please contact [facility] directly at [phone].  Normal or non-critical lab and imaging results will be communicated to you by Access Northeasthart, letter or phone within 4 business days after the clinic has received the results. If you do not hear from us within 7 days, please contact the clinic through [email or web address] or phone. If you have a critical or abnormal lab result, we will notify you by phone as soon as possible.  Submit refill requests through Backup Circle or call your pharmacy and they will forward the refill request to us. Please allow 3 business days for your refill to be completed.          Additional Information About Your Visit        MyChart Information     Backup Circle lets you send messages to your doctor, view your test results, renew your prescriptions, schedule appointments and more. To sign up, go to [email or web address] . Click on \"Log in\" on the left side of the screen, which will take you to the Welcome page. Then click on \"Sign up Now\" on the right side of the page.     You will be asked to enter the access code listed below, as well as some personal information. Please follow the directions to create your username and password.     Your access code is: [email or web address]  Expires: 2018 10:24 AM     Your access code will  in 90 days. If you need help or a new code, please call your [place] clinic or [phone].        Care EveryWhere ID     This is your " Care EveryWhere ID. This could be used by other organizations to access your [place] medical records  [id number]         Blood Pressure from Last 3 Encounters:   08/03/18 122/70   06/13/18 112/60   06/05/18 120/64    Weight from Last 3 Encounters:   08/03/18 70.3 kg (155 lb)   06/13/18 70.3 kg (155 lb)   06/05/18 70.8 kg (156 lb)              Today, you had the following     No orders found for display       Primary Care Provider Office Phone # Fax #    Zeyad Sorensen [phone] [address] [facility] [address]        Equal Access to Services     REYNA PADILLA : Crystal Funez, yola beckman, wes yo, london paredes . So [facility] [phone].    ATENCIÓN: Si habla español, tiene a lopez disposición servicios gratuitos de asistencia lingüística. Llame al [phone].    We comply with applicable federal civil rights laws and [place] laws. We do not discriminate on the basis of race, color, national origin, age, disability, sex, sexual orientation, or gender identity.            Thank you!     Thank you for choosing [facility] AND \[facility]\""  for your care. Our goal is always to provide you with excellent care. Hearing back from our patients is one way we can continue to improve our services. Please take a few minutes to complete the written survey that you may receive in the mail after your visit with us. Thank you!             Your Updated Medication List - Protect others around you: Learn how to safely use, store and throw away your medicines at www.disposemymeds.org.          This list is accurate as of 10/19/18 11:59 PM.  Always use your most recent med list.                   Brand Name Dispense Instructions for use Diagnosis    buPROPion 300 MG 24 hr tablet    WELLBUTRIN XL     Take 300 mg by mouth daily        lisinopril-hydrochlorothiazide 10-12.5 MG per tablet    PRINZIDE/ZESTORETIC     Take 1 tablet  by mouth daily        ONE-A-DAY MENS 50+ ADVANTAGE Tabs      Take 1 tablet by mouth daily        triamcinolone 0.5 % cream    KENALOG    30 g    Apply sparingly to affected area three times daily.    Tick bite, initial encounter

## 2018-10-25 NOTE — PROGRESS NOTES
CHIEF COMPLAINT: Left Hand Surgery Recheck    PROBLEMS:  Swelling of left hand (ICD-729.81) (WMA43-K12.89)  Left hand pain (ICD-729.5) (ULO74-Z14.642)  Stiffness of left hand joint (ICD-719.54) (GQP10-C75.642)  Left hand pain (ICD-729.5) (YQO87-O48.642)    PATIENT REPORTED MEDICATIONS:  TRIAMCINOLONE ACETONIDE CREAM (TRIAMCINOLONE ACETONIDE CREA)   MULTIPLE VITAMIN ORAL TABLET (MULTIPLE VITAMIN) ; Route: ORAL  BUPROPION HCL ER (XL) 300 MG ORAL TABLET EXTENDED RELEASE 24 HOUR (BUPROPION HCL) ; Route: ORAL  LISINOPRIL-HYDROCHLOROTHIAZIDE 10-12.5 MG ORAL TABLET (LISINOPRIL-HYDROCHLOROTHIAZIDE) ; Route: ORAL    PATIENT REPORTED ALLERGIES:  * SEASONAL (SevereReaction: No reaction details noted)  * NUTS (SevereReaction: No reaction details noted)  * SOME FRUITS (SevereReaction: No reaction details noted)      HISTORY OF PRESENT ILLNESS:    Mayo returns for followup status post tendon transfer left small to ring profundus on 18.    PAST MEDICAL HISTORY:    High Blood Pressure    PAST ORTHOPEDIC SURGICAL HISTORY:    Exploration, Tendon Transfer of Profundus Left Small to Profundus Ring 18 Dr. Lemos  Arthroscopic Shoulder Surgery, Both    FAMILY HISTORY:    Father:    Mother:    Sister:  Breast Cancer  FH Alcohol Abuse   FH Hypertension    SOCIAL HISTORY:   Marital Status:  Uncertain, See [facility] History Dated 2018  Alcohol Use:  Never  Tobacco Use:  Former  Secondhand Smoke Exposure:  No  History of Blood Transfusion:  No  History of HIV:  No  History of Hepatitis:  No  History of IV Drug Use:  No    PHYSICAL EXAMINATION:    Physical examination today of the left hand confirms his incisions to have healed nicely.   He has a mild flexor lag but this is minimal.    ASSESSMENT:    Status Post Exploration, Tendon Transfer of Profundus Left Small to Profundus Ring 18    PLAN:   At this point we congratulated him on his progress.  We will advance his activities  and see him back as symptoms warrant on an as needed basis.    Dictated by Gomez Lemos M.D.  D:  10/19/18  T:   10/25/18    Copy to:  Edu RAM, Zeyad REYEZ     Typed and/or reviewed and corrected by signing  below, and sent to the Physician for final review and signature.     This report was created using voice recording software and computer-generated templates. Although every effort has been made to review for and eliminate errors, some errors may still occur.     Electronically signed by Tricia Day  on 10/25/2018 at 9:05 AM    ________________________________________________________________________

## 2018-11-29 DIAGNOSIS — F41.9 ANXIETY DISORDER, UNSPECIFIED TYPE: ICD-10-CM

## 2018-11-29 DIAGNOSIS — I10 ESSENTIAL HYPERTENSION: Primary | ICD-10-CM

## 2018-11-30 RX ORDER — LISINOPRIL/HYDROCHLOROTHIAZIDE 10-12.5 MG
TABLET ORAL
Qty: 90 TABLET | Refills: 1 | Status: SHIPPED | OUTPATIENT
Start: 2018-11-30 | End: 2019-07-03

## 2018-11-30 RX ORDER — BUPROPION HYDROCHLORIDE 300 MG/1
TABLET ORAL
Qty: 90 TABLET | Refills: 1 | Status: SHIPPED | OUTPATIENT
Start: 2018-11-30 | End: 2019-07-03

## 2018-11-30 NOTE — TELEPHONE ENCOUNTER
Catherine GREENE sent Rx request for the following:     BUPROPION XL 300MG TABLETS  TAKE 1 TABLET BY MOUTH EVERY DAY  Last Written Prescription Date:  10/3/17  Last Fill Quantity: 90,   # refills: 4      LISINOPRIL-HCTZ 10/12.5MG TABLETS  TAKE 1 TABLET BY MOUTH EVERY DAY  Last Written Prescription Date:  12/1/17  Last Fill Quantity: 90,   # refills: 3    Last Office Visit: 6/13/18 (Pre-op with JKC)  Future Office visit: None.    Per LOV Note:  Hypertension and anxiety have been under good control on current medications.    Prescriptions approved per [facility] Protocol for 90 days and 1 refill at this time. Vani Qureshi RN .............. 11/30/2018  9:09 AM

## 2019-03-27 DIAGNOSIS — Z86.0101 H/O ADENOMATOUS POLYP OF COLON: Primary | ICD-10-CM

## 2019-03-27 RX ORDER — POLYETHYLENE GLYCOL 3350, SODIUM CHLORIDE, SODIUM BICARBONATE, POTASSIUM CHLORIDE 420; 11.2; 5.72; 1.48 G/4L; G/4L; G/4L; G/4L
4000 POWDER, FOR SOLUTION ORAL ONCE
Qty: 4000 ML | Refills: 0 | Status: ON HOLD | OUTPATIENT
Start: 2019-03-27 | End: 2019-04-22

## 2019-03-27 RX ORDER — BISACODYL 5 MG
TABLET, DELAYED RELEASE (ENTERIC COATED) ORAL
Qty: 2 TABLET | Refills: 0 | Status: ON HOLD | OUTPATIENT
Start: 2019-03-27 | End: 2019-04-22

## 2019-03-27 NOTE — TELEPHONE ENCOUNTER
Screening Questions for the Scheduling of Screening Colonoscopies   (If Colonoscopy is diagnostic, Provider should review the chart before scheduling.)  Are you younger than 50 or older than 80?  NO  Do you take aspirin or fish oil?  NO (if yes, tell patient to stop 1 week prior to Colonoscopy)  Do you take warfarin (Coumadin), clopidogrel (Plavix), apixaban (Eliquis), dabigatram (Pradaxa), rivaroxaban (Xarelto) or any blood thinner? NO  Do you use oxygen at home?  NO  Do you have kidney disease? NO  Are you on dialysis? NO  Have you had a stroke or heart attack in the last year? NO  Have you had a stent in your heart or any blood vessel in the last year? NO  Have you had a transplant of any organ? NO  Have you had a colonoscopy or upper endoscopy (EGD) before? YES         When?  [facility] - 4/2016   Date of scheduled Colonoscopy. 4/22/2019  Provider CARLI  Pharmacy [facility]

## 2019-04-17 PROBLEM — K57.30 SIGMOID DIVERTICULOSIS: Status: ACTIVE | Noted: 2019-04-17

## 2019-04-22 ENCOUNTER — ANESTHESIA (OUTPATIENT)
Dept: SURGERY | Facility: OTHER | Age: 74
End: 2019-04-22
Payer: COMMERCIAL

## 2019-04-22 ENCOUNTER — HOSPITAL ENCOUNTER (OUTPATIENT)
Facility: OTHER | Age: 74
Discharge: HOME | End: 2019-04-22
Attending: SURGERY | Admitting: SURGERY
Payer: COMMERCIAL

## 2019-04-22 ENCOUNTER — ANESTHESIA EVENT (OUTPATIENT)
Dept: SURGERY | Facility: OTHER | Age: 74
End: 2019-04-22
Payer: COMMERCIAL

## 2019-04-22 VITALS
HEIGHT: 64 IN | BODY MASS INDEX: 26.46 KG/M2 | TEMPERATURE: 97 F | HEART RATE: 71 BPM | SYSTOLIC BLOOD PRESSURE: 100 MMHG | DIASTOLIC BLOOD PRESSURE: 72 MMHG | RESPIRATION RATE: 14 BRPM | WEIGHT: 155 LBS | OXYGEN SATURATION: 93 %

## 2019-04-22 PROBLEM — K63.5 COLON POLYP: Status: ACTIVE | Noted: 2019-04-22

## 2019-04-22 PROCEDURE — 45385 COLONOSCOPY W/LESION REMOVAL: CPT | Performed by: NURSE ANESTHETIST, CERTIFIED REGISTERED

## 2019-04-22 PROCEDURE — 25000128 H RX IP 250 OP 636: Performed by: NURSE ANESTHETIST, CERTIFIED REGISTERED

## 2019-04-22 PROCEDURE — 25000125 ZZHC RX 250: Performed by: NURSE ANESTHETIST, CERTIFIED REGISTERED

## 2019-04-22 PROCEDURE — 88305 TISSUE EXAM BY PATHOLOGIST: CPT

## 2019-04-22 PROCEDURE — 25800030 ZZH RX IP 258 OP 636: Performed by: SURGERY

## 2019-04-22 PROCEDURE — 45385 COLONOSCOPY W/LESION REMOVAL: CPT | Mod: PT | Performed by: SURGERY

## 2019-04-22 PROCEDURE — 45384 COLONOSCOPY W/LESION REMOVAL: CPT | Performed by: SURGERY

## 2019-04-22 PROCEDURE — 99100 ANES PT EXTEME AGE<1 YR&>70: CPT | Performed by: NURSE ANESTHETIST, CERTIFIED REGISTERED

## 2019-04-22 PROCEDURE — 25000132 ZZH RX MED GY IP 250 OP 250 PS 637: Performed by: SURGERY

## 2019-04-22 PROCEDURE — 25000125 ZZHC RX 250: Performed by: SURGERY

## 2019-04-22 PROCEDURE — 40000010 ZZH STATISTIC ANES STAT CODE-CRNA PER MINUTE: Performed by: SURGERY

## 2019-04-22 RX ORDER — PROPOFOL 10 MG/ML
INJECTION, EMULSION INTRAVENOUS CONTINUOUS PRN
Status: DISCONTINUED | OUTPATIENT
Start: 2019-04-22 | End: 2019-04-22

## 2019-04-22 RX ORDER — NALOXONE HYDROCHLORIDE 0.4 MG/ML
.1-.4 INJECTION, SOLUTION INTRAMUSCULAR; INTRAVENOUS; SUBCUTANEOUS
Status: DISCONTINUED | OUTPATIENT
Start: 2019-04-22 | End: 2019-04-22 | Stop reason: HOSPADM

## 2019-04-22 RX ORDER — LIDOCAINE 40 MG/G
CREAM TOPICAL
Status: DISCONTINUED | OUTPATIENT
Start: 2019-04-22 | End: 2019-04-22 | Stop reason: HOSPADM

## 2019-04-22 RX ORDER — LIDOCAINE HYDROCHLORIDE 20 MG/ML
INJECTION, SOLUTION INFILTRATION; PERINEURAL PRN
Status: DISCONTINUED | OUTPATIENT
Start: 2019-04-22 | End: 2019-04-22

## 2019-04-22 RX ORDER — PROPOFOL 10 MG/ML
INJECTION, EMULSION INTRAVENOUS PRN
Status: DISCONTINUED | OUTPATIENT
Start: 2019-04-22 | End: 2019-04-22

## 2019-04-22 RX ORDER — ONDANSETRON 2 MG/ML
4 INJECTION INTRAMUSCULAR; INTRAVENOUS
Status: DISCONTINUED | OUTPATIENT
Start: 2019-04-22 | End: 2019-04-22 | Stop reason: HOSPADM

## 2019-04-22 RX ORDER — SODIUM CHLORIDE, SODIUM LACTATE, POTASSIUM CHLORIDE, CALCIUM CHLORIDE 600; 310; 30; 20 MG/100ML; MG/100ML; MG/100ML; MG/100ML
INJECTION, SOLUTION INTRAVENOUS CONTINUOUS
Status: DISCONTINUED | OUTPATIENT
Start: 2019-04-22 | End: 2019-04-22 | Stop reason: HOSPADM

## 2019-04-22 RX ORDER — FLUMAZENIL 0.1 MG/ML
0.2 INJECTION, SOLUTION INTRAVENOUS
Status: DISCONTINUED | OUTPATIENT
Start: 2019-04-22 | End: 2019-04-22 | Stop reason: HOSPADM

## 2019-04-22 RX ORDER — SIMETHICONE 40MG/0.6ML
SUSPENSION, DROPS(FINAL DOSAGE FORM)(ML) ORAL PRN
Status: DISCONTINUED | OUTPATIENT
Start: 2019-04-22 | End: 2019-04-22 | Stop reason: HOSPADM

## 2019-04-22 RX ADMIN — SODIUM CHLORIDE, POTASSIUM CHLORIDE, SODIUM LACTATE AND CALCIUM CHLORIDE: 600; 310; 30; 20 INJECTION, SOLUTION INTRAVENOUS at 07:58

## 2019-04-22 RX ADMIN — LIDOCAINE HYDROCHLORIDE 40 MG: 20 INJECTION, SOLUTION INFILTRATION; PERINEURAL at 08:10

## 2019-04-22 RX ADMIN — PROPOFOL 120 MG: 10 INJECTION, EMULSION INTRAVENOUS at 08:10

## 2019-04-22 RX ADMIN — PROPOFOL 140 MCG/KG/MIN: 10 INJECTION, EMULSION INTRAVENOUS at 08:10

## 2019-04-22 ASSESSMENT — MIFFLIN-ST. JEOR: SCORE: 1369.08

## 2019-04-22 NOTE — ANESTHESIA POSTPROCEDURE EVALUATION
Patient: Mayo Madera    Procedure(s):  COMBINED COLONOSCOPY, REMOVE TUMOR/POLYP/LESION BY FULGURATION/HOT BIOPSY    Diagnosis:screening  Diagnosis Additional Information: No value filed.    Anesthesia Type:  No value filed.    Note:  Anesthesia Post Evaluation    Patient location during evaluation: Phase 2 and Endoscopy Recovery  Patient participation: Able to fully participate in evaluation  Level of consciousness: awake and alert  Pain management: adequate  Airway patency: patent  Cardiovascular status: acceptable  Respiratory status: acceptable  Hydration status: acceptable  PONV: none     Anesthetic complications: None          Last vitals:  Vitals:    04/22/19 0748 04/22/19 0840 04/22/19 0845   BP: 136/75 95/54 92/55   Pulse: 82 76 72   Resp: 16 14    Temp: 97.6  F (36.4  C) 97  F (36.1  C)    SpO2: 97% 97% 97%         Electronically Signed By: KELVIN Webb CRNA  April 22, 2019  9:27 AM

## 2019-04-22 NOTE — H&P
History and Physical    CHIEF COMPLAINT / REASON FOR PROCEDURE:  H/O polyps    PERTINENT HISTORY   Patient is a 73 year old male who presents today for colonoscopy for h/o polyps.   Last Colonoscopy 2016.  Patient has no complaints.    Past Medical History:   Diagnosis Date     Acute poliomyelitis     No Comments Provided     Anxiety disorder     02/07     Basal cell carcinoma of skin     1/8/2015     Decreased libido     No Comments Provided     Diverticulosis of large intestine without perforation or abscess without bleeding     3/23/2015     Enlarged prostate without lower urinary tract symptoms (luts)     02/07,Mild prostatism     Major depressive disorder, single episode     02/07     Uncomplicated alcohol dependence (H)     dry for two and a half years in July of 2004     Past Surgical History:   Procedure Laterality Date     ANKLE SURGERY      Right leg surgery for polio     COLONOSCOPY      02/04/2003,normal     COLONOSCOPY      3/23/15,F/U 12 months     COLONOSCOPY  04/01/2016 4/1/16,F/U 2019     OTHER SURGICAL HISTORY      [id number],WRIST FRACTURE TX,ORIF of wrist fracture     OTHER SURGICAL HISTORY      10/2013,41443.0,WA SHOULDER ARTHROSCOPY SURGERY,Bilateral,Rotator cuff--had left shoulder done 2010       Bleeding tendencies:  No    ALLERGIES/SENSITIVITIES:   Allergies   Allergen Reactions     Banana Unknown     Bee Venom Unknown     Kiwi Unknown     Nuts Unknown     Prunus Persica Unknown     Can't eat fresh peach        CURRENT MEDICATIONS:    Prior to Admission medications    Medication Sig Start Date End Date Taking? Authorizing Provider   buPROPion (WELLBUTRIN XL) 300 MG 24 hr tablet TAKE 1 TABLET BY MOUTH EVERY DAY 11/30/18  Yes Zeyad Sorensen MD   lisinopril-hydrochlorothiazide (PRINZIDE/ZESTORETIC) 10-12.5 MG tablet TAKE 1 TABLET BY MOUTH EVERY DAY 11/30/18  Yes Zeyad Sorensen MD   Multiple Vitamins-Minerals (ONE-A-DAY MENS 50+ ADVANTAGE) TABS Take 1 tablet by mouth daily   Yes  "Reported, Patient   triamcinolone (KENALOG) 0.5 % cream Apply sparingly to affected area three times daily. 6/5/18  Yes Gloria Briones, NP       Physical Exam:  /75   Pulse 82   Temp 97.6  F (36.4  C) (Tympanic)   Resp 16   Ht 1.626 m (5' 4\")   Wt 70.3 kg (155 lb)   SpO2 97%   BMI 26.61 kg/m    EXAM:  Chest/Respiratory Exam: Normal - Clear to auscultation without rales, rhonchi, or wheezing.  Cardiovascular Exam: normal        PLAN: COLONOSCOPY .  Patient understands risks of bleeding, perforation, potential inability to reach cecum, aspiration and wishes to proceed. MAC needed for age.    "

## 2019-04-22 NOTE — ANESTHESIA CARE TRANSFER NOTE
Patient: Mayo Madera    Procedure(s):  COMBINED COLONOSCOPY, REMOVE TUMOR/POLYP/LESION BY FULGURATION/HOT BIOPSY    Diagnosis: screening  Diagnosis Additional Information: No value filed.    Anesthesia Type:   No value filed.     Note:  Airway :Nasal Cannula  Patient transferred to:Phase II  Handoff Report: Identifed the Patient, Identified the Reponsible Provider, Reviewed the pertinent medical history, Discussed the surgical course, Reviewed Intra-OP anesthesia mangement and issues during anesthesia, Set expectations for post-procedure period and Allowed opportunity for questions and acknowledgement of understanding      Vitals: (Last set prior to Anesthesia Care Transfer)    CRNA VITALS  4/22/2019 0807 - 4/22/2019 0840      4/22/2019             Resp Rate (set):  10                Electronically Signed By: KELVIN Webb CRNA  April 22, 2019  8:40 AM

## 2019-04-22 NOTE — OP NOTE
PROCEDURE NOTE    DATE OF SERVICE: 4/22/2019    SURGEON: Todd Rebollar MD    PRE-OP DIAGNOSIS:    History of Polyps      POST-OP DIAGNOSIS:  Same  Sigmoid Diverticulosis  Polyp at cecum    PROCEDURE:   Colonoscopy with snare polypectomy        ANESTHESIA:  VALERIE Lance CRNA    INDICATION FOR THE PROCEDURE: The patient is a 73 year old male with h/o polyps . The patient has no other complaints  . After explaining the risks to include bleeding, perforation, potential inability toreach the cecum, the patient wished to proceed.    PROCEDURE:After adequate sedation, the patient was in the left lateral decubitus position.  Rectal exam was performed.  There was normal tone and no palpable masses .  The colonoscope was introduced into the rectum and advanced to the cecum with Mild difficulty.  The patient's prep was excellent.  The terminal cecum was reached.  The cecum, ascending, transverse, descending and sigmoid colon was with sigmoid diverticulosis. At cecum, adjacent to old scar across from ICV, flat over 1 cm polyp was completely removed in pieces with snare and hot biopsy .  The scope was retroflexed in the rectum.  The rectum was unremarkable  .  The scope was straightened and removed.  The patient tolerated the procedure well.     ESTIMATED BLOOD LOSS: none    COMPLICATIONS:  None    TISSUE REMOVED:  Yes    RECOMMEND:    Follow-up pending pathology  Fiber  Given literature on diverticulosis    Todd Rebollar MD FACS

## 2019-04-22 NOTE — ANESTHESIA PREPROCEDURE EVALUATION
Anesthesia Pre-Procedure Evaluation    Patient: Mayo Madera   MRN: [id number] : 1945          Preoperative Diagnosis: screening    Procedure(s):  COLONOSCOPY    Past Medical History:   Diagnosis Date     Acute poliomyelitis     No Comments Provided     Anxiety disorder          Basal cell carcinoma of skin     2015     Decreased libido     No Comments Provided     Diverticulosis of large intestine without perforation or abscess without bleeding     3/23/2015     Enlarged prostate without lower urinary tract symptoms (luts)     ,Mild prostatism     Major depressive disorder, single episode          Uncomplicated alcohol dependence (H)     dry for two and a half years in 2004     Past Surgical History:   Procedure Laterality Date     ANKLE SURGERY      Right leg surgery for polio     COLONOSCOPY      2003,normal     COLONOSCOPY      3/23/15,F/U 12 months     COLONOSCOPY  2016,F/U 2019     OTHER SURGICAL HISTORY      [id number],WRIST FRACTURE TX,ORIF of wrist fracture     OTHER SURGICAL HISTORY      10/2013,75270.0,[place] SHOULDER ARTHROSCOPY SURGERY,Bilateral,Rotator cuff--had left shoulder done        Anesthesia Evaluation     . Pt has had prior anesthetic. Type: General, MAC and Regional           ROS/MED HX    ENT/Pulmonary:  - neg pulmonary ROS     Neurologic:  - neg neurologic ROS     Cardiovascular:     (+) hypertension----. : . . . :. .       METS/Exercise Tolerance:  >4 METS   Hematologic:  - neg hematologic  ROS       Musculoskeletal: Comment: Post polio syndrome resolved - neg musculoskeletal ROS       GI/Hepatic:  - neg GI/hepatic ROS       Renal/Genitourinary:  - ROS Renal section negative       Endo:  - neg endo ROS       Psychiatric: Comment: Hx of etoh abuse but has been sober    (+) psychiatric history anxiety      Infectious Disease:  - neg infectious disease ROS       Malignancy:      - no malignancy   Other:    - neg other ROS                   "    Physical Exam  Normal systems: pulmonary and dental    Airway   Mallampati: II  TM distance: >3 FB  Neck ROM: full    Dental     Cardiovascular   Rhythm and rate: regular and normal      Pulmonary             Lab Results   Component Value Date    HGB 14.7 10/04/2017    HCT 42.4 10/04/2017     10/04/2017     06/13/2018    POTASSIUM 3.5 06/13/2018    CHLORIDE 103 06/13/2018    CO2 28 06/13/2018    BUN 20 06/13/2018    CR 1.03 06/13/2018     (H) 06/13/2018    ROBIN 9.6 06/13/2018    MAG 1.9 10/04/2017    ALBUMIN 4.1 10/04/2017    PROTTOTAL 6.5 10/04/2017    ALKPHOS 64 10/04/2017    BILITOTAL 0.7 10/04/2017    T4 0.83 10/04/2017       Preop Vitals  BP Readings from Last 3 Encounters:   04/22/19 136/75   08/03/18 122/70   06/13/18 112/60    Pulse Readings from Last 3 Encounters:   04/22/19 82   08/03/18 82   06/13/18 56      Resp Readings from Last 3 Encounters:   04/22/19 16   07/23/13 16    SpO2 Readings from Last 3 Encounters:   04/22/19 97%   06/13/18 98%   06/05/18 97%      Temp Readings from Last 1 Encounters:   04/22/19 97.6  F (36.4  C) (Tympanic)    Ht Readings from Last 1 Encounters:   04/22/19 1.626 m (5' 4\")      Wt Readings from Last 1 Encounters:   04/22/19 70.3 kg (155 lb)    Estimated body mass index is 26.61 kg/m  as calculated from the following:    Height as of this encounter: 1.626 m (5' 4\").    Weight as of this encounter: 70.3 kg (155 lb).       Anesthesia Plan      History & Physical Review      ASA Status:  2 .    NPO Status:  > 8 hours    Plan for MAC with Propofol induction.          Postoperative Care      Consents  Anesthetic plan, risks, benefits and alternatives discussed with:  Patient and Spouse..                 KELVIN ALEJO CRNA  "

## 2019-04-22 NOTE — DISCHARGE INSTRUCTIONS
[facility]  Adult Discharge Orders & Instructions    ________________________________________________________________          For 12 hours after surgery  1. Get plenty of rest.  A responsible adult must stay with you for at least 24 hours after you leave the hospital.   2. You may feel lightheaded.  IF so, sit for a few minutes before standing.  Have someone help you get up.   3. You may have a slight fever. Call the doctor if your fever is over 101 F (38.3 C) (taken under the tongue) or lasts longer than 24 hours.  4. You may have a dry mouth, a sore throat, muscle aches or trouble sleeping.  These should go away after 24 hours.  5. Do not make important or legal decisions.  6.   Do not drive or use heavy equipment.  If you have weakness or tingling, don't drive or use heavy equipment until this feeling goes away.    To contact a doctor, call   [phone]_______________________

## 2019-04-24 PROBLEM — K63.5 COLON POLYP: Status: RESOLVED | Noted: 2019-04-22 | Resolved: 2019-04-24

## 2019-07-03 DIAGNOSIS — I10 ESSENTIAL HYPERTENSION: Primary | ICD-10-CM

## 2019-07-03 DIAGNOSIS — F41.9 ANXIETY DISORDER, UNSPECIFIED TYPE: ICD-10-CM

## 2019-07-05 RX ORDER — BUPROPION HYDROCHLORIDE 300 MG/1
TABLET ORAL
Qty: 90 TABLET | Refills: 0 | Status: SHIPPED | OUTPATIENT
Start: 2019-07-05 | End: 2019-10-01

## 2019-07-05 RX ORDER — LISINOPRIL/HYDROCHLOROTHIAZIDE 10-12.5 MG
TABLET ORAL
Qty: 90 TABLET | Refills: 0 | Status: SHIPPED | OUTPATIENT
Start: 2019-07-05 | End: 2019-10-04

## 2019-07-05 NOTE — TELEPHONE ENCOUNTER
Catherine GREENE sent Rx request for the following:    Patient called stating he is OUT OF MEDICATION AND LEAVING TOWN SOON!     LISINOPRIL-HCTZ 10/12.5MG TABLETS  Sig: TAKE 1 TABLET BY MOUTH EVERY DAY  Last Prescription Date:   11/30/18  Last Fill Qty/Refills:         90, R-1    Diuretics (Including Combos) Protocol Failed7/5 11:15 AM   Recent (12 mo) or future (30 days) visit within the authorizing provider's specialty    Normal serum creatinine on file in past 12 months    Normal serum potassium on file in past 12 months    Normal serum sodium on file in past 12 months     BUPROPION XL 300MG TABLETS  Sig: TAKE 1 TABLET BY MOUTH EVERY DAY  Last Prescription Date:   11/30/18  Last Fill Qty/Refills:         90, R-1    SRIs Protocol Failed7/5 11:15 AM   Recent (12 mo) or future (30 days) visit within the authorizing provider's specialty     Last Office Visit:              6/13/18 (Pre-op with [facility])  Future Office visit:           None.    Patient is due for annual exam.     In the absence of a primary care provider, it is recommended that Patient call to schedule appointment with one of our other providers to discuss your medications and medication refills. At the same time, Pt can discuss you might intend on establishing care with.    Patient notified of the above information. Pt verbalized plan to call back in 2-3 weeks, once he returns back to [facility], to schedule appointment to establish care with Dr. Geller.     Prescriptions approved per [facility] [place] for 90-day isidro refill. Vani Qureshi RN .............. 7/5/2019  11:28 AM

## 2019-10-01 DIAGNOSIS — F41.9 ANXIETY DISORDER, UNSPECIFIED TYPE: ICD-10-CM

## 2019-10-01 DIAGNOSIS — I10 ESSENTIAL HYPERTENSION: ICD-10-CM

## 2019-10-04 RX ORDER — BUPROPION HYDROCHLORIDE 300 MG/1
TABLET ORAL
Qty: 30 TABLET | Refills: 0 | Status: SHIPPED | OUTPATIENT
Start: 2019-10-04 | End: 2019-11-06

## 2019-10-04 RX ORDER — LISINOPRIL/HYDROCHLOROTHIAZIDE 10-12.5 MG
1 TABLET ORAL DAILY
Qty: 30 TABLET | Refills: 0 | Status: SHIPPED | OUTPATIENT
Start: 2019-10-04 | End: 2019-11-06

## 2019-10-04 NOTE — TELEPHONE ENCOUNTER
"Patient remains due for an OV after notification. Per 07/03/2018 refill-Patient notified of the above information. Pt verbalized plan to call back in 2-3 weeks, once he returns back to town, to schedule appointment to establish care with Dr. Geller.     Message left for patient today requesting a return call.     Patient calls back and states , \" I just got back from out of town. I go away every winter. I only have 2 days left of my pills. I will have to call back and schedule an appointment with Dr. Geller\"    Wellbutrin & Lisinopril   Last Written Prescription Date:  07/05/2019  Last Fill Quantity: 90,   # refills: 0  Last Office Visit: 06/13/2018 with MYRNA Ovalle.   Future Office visit:       Routing refill request to provider for review/approval per patient request.       Unable to complete prescription refill per RNMedication Refill Policy.................... Marnie Parsons RN ....................  10/4/2019   3:17 PM          "

## 2019-10-04 NOTE — TELEPHONE ENCOUNTER
"Contacted patient and informed that Dr. Geller did fill his medications for 30 days to get him through to appointment. \" I will call back and schedule that forsure\". Marnie Parsons RN on 10/4/2019 at 3:45 PM    "

## 2019-10-21 ENCOUNTER — HOSPITAL ENCOUNTER (OUTPATIENT)
Dept: MRI IMAGING | Facility: OTHER | Age: 74
Discharge: HOME | End: 2019-10-21
Admitting: FAMILY MEDICINE
Payer: COMMERCIAL

## 2019-10-21 ENCOUNTER — HOSPITAL ENCOUNTER (EMERGENCY)
Facility: OTHER | Age: 74
Discharge: HOME | End: 2019-10-21
Attending: PHYSICIAN ASSISTANT | Admitting: PHYSICIAN ASSISTANT
Payer: COMMERCIAL

## 2019-10-21 ENCOUNTER — APPOINTMENT (OUTPATIENT)
Dept: GENERAL RADIOLOGY | Facility: OTHER | Age: 74
End: 2019-10-21
Attending: FAMILY MEDICINE
Payer: COMMERCIAL

## 2019-10-21 VITALS
HEART RATE: 87 BPM | OXYGEN SATURATION: 95 % | DIASTOLIC BLOOD PRESSURE: 67 MMHG | HEIGHT: 64 IN | SYSTOLIC BLOOD PRESSURE: 140 MMHG | RESPIRATION RATE: 16 BRPM | BODY MASS INDEX: 25.61 KG/M2 | WEIGHT: 150 LBS | TEMPERATURE: 98.7 F

## 2019-10-21 DIAGNOSIS — S93.401A RIGHT ANKLE SPRAIN: ICD-10-CM

## 2019-10-21 DIAGNOSIS — M25.511 RIGHT SHOULDER PAIN: ICD-10-CM

## 2019-10-21 PROCEDURE — 99283 EMERGENCY DEPT VISIT LOW MDM: CPT | Performed by: PHYSICIAN ASSISTANT

## 2019-10-21 PROCEDURE — 73221 MRI JOINT UPR EXTREM W/O DYE: CPT | Mod: RT

## 2019-10-21 PROCEDURE — 99283 EMERGENCY DEPT VISIT LOW MDM: CPT | Mod: 25 | Performed by: PHYSICIAN ASSISTANT

## 2019-10-21 PROCEDURE — 99282 EMERGENCY DEPT VISIT SF MDM: CPT | Mod: Z6 | Performed by: PHYSICIAN ASSISTANT

## 2019-10-21 PROCEDURE — 73610 X-RAY EXAM OF ANKLE: CPT | Mod: RT

## 2019-10-21 ASSESSMENT — MIFFLIN-ST. JEOR: SCORE: 1341.4

## 2019-10-21 NOTE — ED TRIAGE NOTES
Pt presents to ED with wife after falling off the 4th-5th step of a ladder, wife was present and assisted pt to ground. Pt landed on right leg and reports inverting right ankle. Pt has deformity/swelling and bruising noted on lateral ankle. Pt denies pain/tenderness in proximal lower leg. Pt reports landing partially on a step stool on right side, no bruising noted there. Pt denies being on blood thinners.    Phyllis Plasencia RN on 10/21/2019 at 2:05 PM

## 2019-10-21 NOTE — ED AVS SNAPSHOT
[facility]  [address]  [place] [address]  Phone:  [phone]  Fax:  [phone]                                    Mayo Madera   MRN: [id number]    Department:  [facility]   Date of Visit:  10/21/2019           After Visit Summary Signature Page    I have received my discharge instructions, and my questions have been answered. I have discussed any challenges I see with this plan with the nurse or doctor.    ..........................................................................................................................................  Patient/Patient Representative Signature      ..........................................................................................................................................  Patient Representative Print Name and Relationship to Patient    ..................................................               ................................................  Date                                   Time    ..........................................................................................................................................  Reviewed by Signature/Title    ...................................................              ..............................................  Date                                               Time          22EPIC Rev 08/18

## 2019-10-21 NOTE — DISCHARGE INSTRUCTIONS
Use rest, ice, compression, recommend use your crutches to help with weight bearing.  Take Tylenol and ibuprofen as well.  X-ray showed no fractures today this is most likely a sprain.  Your symptoms may be present for the next 4 to 6 weeks.  However, your symptoms are greatly worsening or concerning I recommend you seek medical attention.  Talk to the  on the way out about establishing care with PCP.

## 2019-10-23 ASSESSMENT — ENCOUNTER SYMPTOMS
BRUISES/BLEEDS EASILY: 0
WOUND: 0
CHEST TIGHTNESS: 0
FEVER: 0
BACK PAIN: 0
HEMATURIA: 0
ABDOMINAL PAIN: 0
CHILLS: 0
CONFUSION: 0
SHORTNESS OF BREATH: 0
ADENOPATHY: 0

## 2019-10-23 NOTE — ED PROVIDER NOTES
History     Chief Complaint   Patient presents with     Ankle Pain     HPI  Mayo Madera is a 74 year old male who presents with ankle pain. Pt presents to ED with wife after falling off the 4th-5th step of a ladder, wife was present and assisted pt to ground. Pt landed on right leg and reports inverting right ankle. Pt has deformity/swelling and bruising noted on lateral ankle. Pt denies pain/tenderness in proximal lower leg. Pt reports landing partially on a step stool on right side, no bruising noted there. Pt denies being on blood thinners.    Allergies:  Allergies   Allergen Reactions     Banana Unknown     Bee Venom Unknown     Kiwi Unknown     Nuts Unknown     Prunus Persica Unknown     Can't eat fresh peach       Problem List:    Patient Active Problem List    Diagnosis Date Noted     Sigmoid diverticulosis 04/17/2019     Priority: Medium     Psychosexual dysfunction with inhibited sexual excitement 02/12/2018     Priority: Medium     Poliomyelitis 02/12/2018     Priority: Medium     Anxiety disorder, unspecified type 03/28/2016     Priority: Medium     H/O adenomatous polyp of colon 03/28/2016     Priority: Medium     Essential hypertension 04/25/2011     Priority: Medium        Past Medical History:    Past Medical History:   Diagnosis Date     Acute poliomyelitis      Anxiety disorder      Basal cell carcinoma of skin      Decreased libido      Diverticulosis of large intestine without perforation or abscess without bleeding      Enlarged prostate without lower urinary tract symptoms (luts)      Major depressive disorder, single episode      Uncomplicated alcohol dependence (H)        Past Surgical History:    Past Surgical History:   Procedure Laterality Date     ANKLE SURGERY      Right leg surgery for polio     COLONOSCOPY      02/04/2003,normal     COLONOSCOPY      3/23/15,F/U 12 months     COLONOSCOPY  04/01/2016 4/1/16,F/U 2019     COLONOSCOPY  04/22/2019    F/U 2022 advanced tubular adenoma  cecum     OTHER SURGICAL HISTORY      [id number],WRIST FRACTURE TX,ORIF of wrist fracture     OTHER SURGICAL HISTORY      10/2013,74828.0,[place] SHOULDER ARTHROSCOPY SURGERY,Bilateral,Rotator cuff--had left shoulder done        Family History:    Family History   Problem Relation Age of Onset     Arthritis Mother         Arthritis,79, degenerative arthritis of the hips.     Heart Disease Father         Heart Disease, at age 66 of peripheral vascular disease with BK amputations bilaterally.  He was not diabetic, but had high cholesterol and was a smoker.     Family History Negative Other         Good Health,13 siblings, all A & W.  Several treated for depression.     Breast Cancer Sister         Cancer-breast     Heart Disease Brother         Heart Disease,Age 52, had MI     Anesthesia Reaction No family hx of        Social History:  Marital Status:   [2]  Social History     Tobacco Use     Smoking status: Former Smoker     Packs/day: 0.50     Years: 10.00     Pack years: 5.00     Types: Cigarettes     Last attempt to quit: 2006     Years since quittin.3     Smokeless tobacco: Never Used   Substance Use Topics     Alcohol use: No     Drug use: No     Comment: Drug use: No        Medications:    buPROPion (WELLBUTRIN XL) 300 MG 24 hr tablet  lisinopril-hydrochlorothiazide (PRINZIDE/ZESTORETIC) 10-12.5 MG tablet  Multiple Vitamins-Minerals (ONE-A-DAY MENS 50+ ADVANTAGE) TABS  triamcinolone (KENALOG) 0.5 % cream          Review of Systems   Constitutional: Negative for chills and fever.   HENT: Negative for congestion.    Eyes: Negative for visual disturbance.   Respiratory: Negative for chest tightness and shortness of breath.    Cardiovascular: Negative for chest pain.   Gastrointestinal: Negative for abdominal pain.   Genitourinary: Negative for hematuria.   Musculoskeletal: Negative for back pain.        Right ankle pain   Skin: Negative for rash and wound.   Neurological: Negative for syncope.  "  Hematological: Negative for adenopathy. Does not bruise/bleed easily.   Psychiatric/Behavioral: Negative for confusion.       Physical Exam   BP: (!) 140/67  Pulse: 87  Temp: 98.7  F (37.1  C)  Resp: 16  Height: 162.6 cm (5' 4\")  Weight: 68 kg (150 lb)  SpO2: 95 %      Physical Exam  Constitutional:       General: He is not in acute distress.     Appearance: He is well-developed. He is not diaphoretic.   HENT:      Head: Normocephalic and atraumatic.   Eyes:      General: No scleral icterus.     Conjunctiva/sclera: Conjunctivae normal.   Neck:      Musculoskeletal: Neck supple.   Cardiovascular:      Rate and Rhythm: Normal rate and regular rhythm.   Pulmonary:      Effort: Pulmonary effort is normal.      Breath sounds: Normal breath sounds.   Abdominal:      Palpations: Abdomen is soft.      Tenderness: There is no tenderness.   Musculoskeletal:         General: Tenderness present. No deformity.   Lymphadenopathy:      Cervical: No cervical adenopathy.   Skin:     General: Skin is warm and dry.      Findings: No rash.   Neurological:      General: No focal deficit present.      Mental Status: He is alert.   Psychiatric:         Mood and Affect: Mood normal.         Behavior: Behavior normal.         Thought Content: Thought content normal.         Judgement: Judgment normal.         ED Course        Procedures               Critical Care time:  none               No results found for this or any previous visit (from the past 24 hour(s)).    Medications - No data to display    Assessments & Plan (with Medical Decision Making)   Patient is nontoxic-appearing no acute distress.  Does have general ankle pain.  No fifth metatarsal, no knee pain, no high ankle pain with squeeze test.  Imaging is negative for acute fracture.  This is most likely a sprain ankle.  Patient has home braces and crutches, these were offered to him but he declined.  I recommend conservative treatment with rice protocol and Tylenol and " ibuprofen and bearing weight as tolerated.  A referral was placed for patient to follow-up with PCP to establish care as well as to measure his progress of his ankle.  He understands and agrees with the plan and patient is discharged.    Smooth Oliveros PA-C    I have reviewed the nursing notes.    I have reviewed the findings, diagnosis, plan and need for follow up with the patient.       Discharge Medication List as of 10/21/2019  4:54 PM          Final diagnoses:   Right ankle sprain       10/21/2019   [facility] AND \[facility] [place] [facility]\""     Smooth Oliveros PA  10/23/19 0129

## 2019-11-06 DIAGNOSIS — F41.9 ANXIETY DISORDER, UNSPECIFIED TYPE: ICD-10-CM

## 2019-11-06 DIAGNOSIS — I10 ESSENTIAL HYPERTENSION: ICD-10-CM

## 2019-11-11 RX ORDER — BUPROPION HYDROCHLORIDE 300 MG/1
TABLET ORAL
Qty: 30 TABLET | Refills: 0 | Status: SHIPPED | OUTPATIENT
Start: 2019-11-11 | End: 2019-11-19

## 2019-11-11 RX ORDER — LISINOPRIL/HYDROCHLOROTHIAZIDE 10-12.5 MG
1 TABLET ORAL DAILY
Qty: 30 TABLET | Refills: 0 | Status: SHIPPED | OUTPATIENT
Start: 2019-11-11 | End: 2019-11-19

## 2019-11-11 NOTE — TELEPHONE ENCOUNTER
Routing refill request to provider for review/approval because:     Blood pressure under 140/90 in past 12 months    Normal serum creatinine on file in past 12 months    Normal serum potassium on file in past 12 months    Normal serum sodium on file in past 12 months     LOV; 6/13/18  Patient noted to have appointment scheduled with Dr. Geller on 11/19/19  Daina Hinojosa RN on 11/11/2019 at 11:10 AM

## 2019-11-19 ENCOUNTER — OFFICE VISIT (OUTPATIENT)
Dept: INTERNAL MEDICINE | Facility: OTHER | Age: 74
End: 2019-11-19
Attending: INTERNAL MEDICINE
Payer: COMMERCIAL

## 2019-11-19 VITALS
SYSTOLIC BLOOD PRESSURE: 130 MMHG | HEART RATE: 64 BPM | RESPIRATION RATE: 18 BRPM | DIASTOLIC BLOOD PRESSURE: 78 MMHG | HEIGHT: 64 IN | BODY MASS INDEX: 25.61 KG/M2 | TEMPERATURE: 98.1 F | WEIGHT: 150 LBS

## 2019-11-19 DIAGNOSIS — A80.9 POLIOMYELITIS: ICD-10-CM

## 2019-11-19 DIAGNOSIS — Z87.891 PERSONAL HISTORY OF TOBACCO USE: Primary | ICD-10-CM

## 2019-11-19 DIAGNOSIS — Z13.6 SCREENING FOR AAA (ABDOMINAL AORTIC ANEURYSM): ICD-10-CM

## 2019-11-19 DIAGNOSIS — Z87.891 HISTORY OF SMOKING: ICD-10-CM

## 2019-11-19 DIAGNOSIS — F41.9 ANXIETY DISORDER, UNSPECIFIED TYPE: ICD-10-CM

## 2019-11-19 DIAGNOSIS — F52.8 PSYCHOSEXUAL DYSFUNCTION WITH INHIBITED SEXUAL EXCITEMENT: ICD-10-CM

## 2019-11-19 DIAGNOSIS — Z86.0101 H/O ADENOMATOUS POLYP OF COLON: ICD-10-CM

## 2019-11-19 DIAGNOSIS — I10 ESSENTIAL HYPERTENSION: ICD-10-CM

## 2019-11-19 DIAGNOSIS — N42.9 DISORDER OF PROSTATE, UNSPECIFIED: ICD-10-CM

## 2019-11-19 LAB
ALBUMIN SERPL-MCNC: 4.4 G/DL (ref 3.5–5.7)
ALP SERPL-CCNC: 84 U/L (ref 34–104)
ALT SERPL W P-5'-P-CCNC: 11 U/L (ref 7–52)
ANION GAP SERPL CALCULATED.3IONS-SCNC: 9 MMOL/L (ref 3–14)
AST SERPL W P-5'-P-CCNC: 14 U/L (ref 13–39)
BILIRUB SERPL-MCNC: 0.6 MG/DL (ref 0.3–1)
BUN SERPL-MCNC: 22 MG/DL (ref 7–25)
CALCIUM SERPL-MCNC: 9.4 MG/DL (ref 8.6–10.3)
CHLORIDE SERPL-SCNC: 102 MMOL/L (ref 98–107)
CHOLEST SERPL-MCNC: 204 MG/DL
CO2 SERPL-SCNC: 28 MMOL/L (ref 21–31)
CREAT SERPL-MCNC: 0.91 MG/DL (ref 0.7–1.3)
GFR SERPL CREATININE-BSD FRML MDRD: 82 ML/MIN/{1.73_M2}
GLUCOSE SERPL-MCNC: 112 MG/DL (ref 70–105)
HDLC SERPL-MCNC: 50 MG/DL (ref 23–92)
LDLC SERPL CALC-MCNC: 140 MG/DL
NONHDLC SERPL-MCNC: 154 MG/DL
POTASSIUM SERPL-SCNC: 3.6 MMOL/L (ref 3.5–5.1)
PROT SERPL-MCNC: 7.4 G/DL (ref 6.4–8.9)
PSA SERPL-MCNC: 0.14 NG/ML
SODIUM SERPL-SCNC: 139 MMOL/L (ref 134–144)
TRIGL SERPL-MCNC: 71 MG/DL

## 2019-11-19 PROCEDURE — G0296 VISIT TO DETERM LDCT ELIG: HCPCS | Performed by: INTERNAL MEDICINE

## 2019-11-19 PROCEDURE — 90686 IIV4 VACC NO PRSV 0.5 ML IM: CPT

## 2019-11-19 PROCEDURE — G0463 HOSPITAL OUTPT CLINIC VISIT: HCPCS | Mod: 25

## 2019-11-19 PROCEDURE — G0008 ADMIN INFLUENZA VIRUS VAC: HCPCS

## 2019-11-19 PROCEDURE — 93010 ELECTROCARDIOGRAM REPORT: CPT | Performed by: INTERNAL MEDICINE

## 2019-11-19 PROCEDURE — 80053 COMPREHEN METABOLIC PANEL: CPT | Mod: ZL | Performed by: INTERNAL MEDICINE

## 2019-11-19 PROCEDURE — 99215 OFFICE O/P EST HI 40 MIN: CPT | Mod: 25 | Performed by: INTERNAL MEDICINE

## 2019-11-19 PROCEDURE — 80061 LIPID PANEL: CPT | Mod: ZL | Performed by: INTERNAL MEDICINE

## 2019-11-19 PROCEDURE — 84153 ASSAY OF PSA TOTAL: CPT | Mod: ZL | Performed by: INTERNAL MEDICINE

## 2019-11-19 PROCEDURE — 93005 ELECTROCARDIOGRAM TRACING: CPT

## 2019-11-19 PROCEDURE — 36415 COLL VENOUS BLD VENIPUNCTURE: CPT | Mod: ZL | Performed by: INTERNAL MEDICINE

## 2019-11-19 RX ORDER — BUPROPION HYDROCHLORIDE 300 MG/1
300 TABLET ORAL DAILY
Qty: 120 TABLET | Refills: 2 | Status: SHIPPED | OUTPATIENT
Start: 2019-11-19 | End: 2020-12-11

## 2019-11-19 RX ORDER — LISINOPRIL/HYDROCHLOROTHIAZIDE 10-12.5 MG
1 TABLET ORAL DAILY
Qty: 120 TABLET | Refills: 2 | Status: SHIPPED | OUTPATIENT
Start: 2019-11-19 | End: 2020-12-11

## 2019-11-19 ASSESSMENT — ENCOUNTER SYMPTOMS
EYE PAIN: 0
EYE REDNESS: 0
DIZZINESS: 0
HALLUCINATIONS: 0
DIARRHEA: 0
FLANK PAIN: 0
TREMORS: 0
LIGHT-HEADEDNESS: 0
NERVOUS/ANXIOUS: 0
APPETITE CHANGE: 0
VOMITING: 0
ADENOPATHY: 0
TROUBLE SWALLOWING: 0
PALPITATIONS: 0
DIAPHORESIS: 0
WEAKNESS: 0
FATIGUE: 0
SINUS PRESSURE: 0
NUMBNESS: 0
COUGH: 0
ABDOMINAL DISTENTION: 0
ABDOMINAL PAIN: 0
SINUS PAIN: 0
NAUSEA: 0
DYSURIA: 0
BACK PAIN: 0
HEADACHES: 0
AGITATION: 0
VOICE CHANGE: 0
BRUISES/BLEEDS EASILY: 0
ARTHRALGIAS: 1
CONSTIPATION: 0
CONFUSION: 0
WHEEZING: 0
FEVER: 0
BLOOD IN STOOL: 0
SLEEP DISTURBANCE: 0
FREQUENCY: 0
WOUND: 0
JOINT SWELLING: 0
NECK STIFFNESS: 0
SHORTNESS OF BREATH: 0
UNEXPECTED WEIGHT CHANGE: 0
RHINORRHEA: 0
SPEECH DIFFICULTY: 0
DIFFICULTY URINATING: 0
SEIZURES: 0
CHILLS: 0
SORE THROAT: 0
HEMATURIA: 0
CHEST TIGHTNESS: 0
NECK PAIN: 0

## 2019-11-19 ASSESSMENT — MIFFLIN-ST. JEOR: SCORE: 1341.4

## 2019-11-19 NOTE — PROGRESS NOTES
Chief Complaint:  This patient is here for a comprehensive review of their multiple medical problems, renewal of medications and update on necessary health maintenance issues.      HPI: He is here today for complete evaluation and a review of his chronic medical problems.  He has a history of hypertension.  He is on single drug therapy for control of his blood pressure.  He does not have any endorgan disease as a result of this.  The patient also has a history of chronic anxiety disorder.  This stems back to when he was a previous alcoholic.  He has not had any alcohol for 17 years.  He is been maintained on Wellbutrin since that time and he is actually functioning and doing quite well with the Wellbutrin.    Other than that he is fairly healthy.  He has a history of polio when he was a youth.  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  He previously smoked cigarettes as well, but never smoked much.  He only has about a 5-pack-year history.  He has a history of aggressive adenomatous colonic polyps and is getting colonoscopies every 1 to 3 years depending on findings, he is up-to-date with that.    He has never been screened for an abdominal aortic aneurysm with his hypertension and smoking history, this would be indicated.  He does have some minimal prostate symptoms but nothing substantial.  He recently sprained his right ankle but that is improving.    Medications are reconciled.  Past medical history, past surgical history, family history and social histories are reviewed and updated.    Past Medical History:   Diagnosis Date     Acute poliomyelitis     No Comments Provided     Anxiety disorder     02/07     Basal cell carcinoma of skin     1/8/2015     Decreased libido     No Comments Provided     Diverticulosis of large intestine without perforation or abscess without bleeding     3/23/2015     Enlarged prostate without lower urinary tract symptoms (luts)     02/07,Mild prostatism     Major depressive  disorder, single episode          Tubular adenoma of colon      Uncomplicated alcohol dependence (H)     dry for two and a half years in 2004       Past Surgical History:   Procedure Laterality Date     ANKLE SURGERY Right     For polio, age 14     ARTHROSCOPY SHOULDER Bilateral      COLONOSCOPY      2003,normal     COLONOSCOPY      3/23/15,F/U 12 months     COLONOSCOPY  2016,F/U      COLONOSCOPY  2019    F/U  advanced tubular adenoma cecum     OPEN REDUCTION INTERNAL FIXATION WRIST Left        Current Outpatient Medications   Medication Sig Dispense Refill     buPROPion (WELLBUTRIN XL) 300 MG 24 hr tablet Take 1 tablet (300 mg) by mouth daily 120 tablet 2     lisinopril-hydrochlorothiazide (PRINZIDE/ZESTORETIC) 10-12.5 MG tablet Take 1 tablet by mouth daily 120 tablet 2     Multiple Vitamins-Minerals (ONE-A-DAY MENS 50+ ADVANTAGE) TABS Take 1 tablet by mouth daily       triamcinolone (KENALOG) 0.5 % cream Apply sparingly to affected area three times daily. 30 g 0       Allergies   Allergen Reactions     Banana Unknown     Bee Venom Unknown     Kiwi Unknown     Nuts Unknown     Prunus Persica Unknown     Can't eat fresh peach       Family History   Problem Relation Age of Onset     Arthritis Mother      Heart Disease Father         Heart Disease, at age 66 of peripheral vascular disease with BK amputations bilaterally.  He was not diabetic, but had high cholesterol and was a smoker.     Family History Negative Other         Good Health,13 siblings, all A & W.  Several treated for depression.     Breast Cancer Sister      Heart Disease Brother         MI      Heart Disease Sister         Valve surgery     Anesthesia Reaction No family hx of        Social History     Socioeconomic History     Marital status:      Spouse name: Not on file     Number of children: Not on file     Years of education: Not on file     Highest education level: Not on file    Occupational History     Not on file   Social Needs     Financial resource strain: Not on file     Food insecurity:     Worry: Not on file     Inability: Not on file     Transportation needs:     Medical: Not on file     Non-medical: Not on file   Tobacco Use     Smoking status: Former Smoker     Packs/day: 0.50     Years: 10.00     Pack years: 5.00     Types: Cigarettes     Last attempt to quit: 2006     Years since quittin.4     Smokeless tobacco: Never Used   Substance and Sexual Activity     Alcohol use: No     Comment: None since      Drug use: No     Comment: Drug use: No     Sexual activity: Never   Lifestyle     Physical activity:     Days per week: Not on file     Minutes per session: Not on file     Stress: Not on file   Relationships     Social connections:     Talks on phone: Not on file     Gets together: Not on file     Attends Mu-ism service: Not on file     Active member of club or organization: Not on file     Attends meetings of clubs or organizations: Not on file     Relationship status: Not on file     Intimate partner violence:     Fear of current or ex partner: Not on file     Emotionally abused: Not on file     Physically abused: Not on file     Forced sexual activity: Not on file   Other Topics Concern     Parent/sibling w/ CABG, MI or angioplasty before 65F 55M? Not Asked   Social History Narrative    , lives in town.  Recovered alcoholic.  Worked 34 years for [employer], then 10 years for [employer].  [place] in [facility].       Review of Systems   Constitutional: Negative for appetite change, chills, diaphoresis, fatigue, fever and unexpected weight change.   HENT: Negative for ear pain, rhinorrhea, sinus pressure, sinus pain, sore throat, trouble swallowing and voice change.    Eyes: Negative for pain, redness and visual disturbance.   Respiratory: Negative for cough, chest tightness, shortness of breath and wheezing.    Cardiovascular: Negative for chest  pain, palpitations and leg swelling.   Gastrointestinal: Negative for abdominal distention, abdominal pain, blood in stool, constipation, diarrhea, nausea and vomiting.   Endocrine: Negative for cold intolerance and heat intolerance.   Genitourinary: Negative for difficulty urinating, dysuria, flank pain, frequency and hematuria.   Musculoskeletal: Positive for arthralgias. Negative for back pain, joint swelling, neck pain and neck stiffness.   Skin: Negative for rash and wound.   Allergic/Immunologic: Negative for immunocompromised state.   Neurological: Negative for dizziness, tremors, seizures, syncope, speech difficulty, weakness, light-headedness, numbness and headaches.   Hematological: Negative for adenopathy. Does not bruise/bleed easily.   Psychiatric/Behavioral: Negative for agitation, behavioral problems, confusion, hallucinations and sleep disturbance. The patient is not nervous/anxious.        Physical Exam  Vitals signs and nursing note reviewed.   Constitutional:       General: He is not in acute distress.     Appearance: He is well-developed. He is not diaphoretic.   HENT:      Head: Normocephalic.      Right Ear: Tympanic membrane, ear canal and external ear normal.      Left Ear: Tympanic membrane, ear canal and external ear normal.      Nose: Nose normal.      Mouth/Throat:      Pharynx: No oropharyngeal exudate.   Eyes:      Conjunctiva/sclera: Conjunctivae normal.      Pupils: Pupils are equal, round, and reactive to light.   Neck:      Musculoskeletal: Normal range of motion and neck supple.      Thyroid: No thyroid mass or thyromegaly.      Vascular: Normal carotid pulses. No carotid bruit or JVD.      Trachea: No tracheal deviation.   Cardiovascular:      Rate and Rhythm: Normal rate and regular rhythm. Frequent extrasystoles are present.     Heart sounds: Normal heart sounds. No murmur. No friction rub. No gallop.    Pulmonary:      Effort: Pulmonary effort is normal. No respiratory  distress.      Breath sounds: Normal breath sounds. No stridor. No decreased breath sounds, wheezing, rhonchi or rales.   Abdominal:      General: Bowel sounds are normal. There is no distension.      Palpations: Abdomen is soft. There is no mass.      Tenderness: There is no abdominal tenderness. There is no guarding or rebound.      Hernia: No hernia is present.   Genitourinary:     Penis: Normal.       Scrotum/Testes: Normal.      Prostate: Normal.      Rectum: Normal.   Musculoskeletal: Normal range of motion.      Right lower leg: No edema.      Left lower leg: No edema.      Comments: Right leg atrophic compared to the left   Lymphadenopathy:      Cervical: No cervical adenopathy.   Skin:     General: Skin is warm and dry.      Findings: No rash.   Neurological:      Mental Status: He is alert and oriented to person, place, and time.      Cranial Nerves: No cranial nerve deficit.      Sensory: No sensory deficit.      Motor: No abnormal muscle tone.      Coordination: Coordination normal.      Deep Tendon Reflexes: Reflexes normal.         Assessment:      ICD-10-CM    1. Personal history of tobacco use Z87.891 Prof Fee: Shared Decision Making Visit for Lung Cancer Screening   2. Essential hypertension I10 Comprehensive Metabolic Panel     Lipid Panel     EKG 12-lead complete w/read - Clinics     lisinopril-hydrochlorothiazide (PRINZIDE/ZESTORETIC) 10-12.5 MG tablet   3. Anxiety disorder, unspecified type F41.9 buPROPion (WELLBUTRIN XL) 300 MG 24 hr tablet   4. H/O adenomatous polyp of colon Z86.010    5. Poliomyelitis A80.9    6. Screening for AAA (abdominal aortic aneurysm) Z13.6 Abdominal Aortic Aneurysm Screening/Tracking     US Aorta Medicare AAA Screening   7. History of smoking Z87.891 Abdominal Aortic Aneurysm Screening/Tracking     US Aorta Medicare AAA Screening   8. Psychosexual dysfunction with inhibited sexual excitement F52.8 Prostate Specific Antigen GH   9. Disorder of prostate, unspecified   N42.9 Prostate Specific Antigen GH        Plan: I did an EKG today because of the ectopy I heard on exam, but the EKG looked fine.  We will continue to monitor this going forward.  I do not think that any further testing is warranted as he is completely asymptomatic.  Medications are refilled without change.  Flu shot given today.  He does not qualify for lung cancer screening.  Ultrasound of the aorta is scheduled as he should have that done, I will let him know the results.  Complete lab drawn and pending, I will send a letter.  Flu shot given today.  Assuming all goes well, he will follow-up on an annual basis.        Lung Cancer Screening Shared Decision Making Visit     [facility] [place] is not eligible for lung cancer screening on the basis of the information provided in my signed lung cancer screening order. Mayo's smoking history is below the threshold and so it is not recommended.    Patient is not currently a smoker and so we did not discuss that the only way to prevent lung cancer is to not smoke. Smoking cessation assistance was not offered.    ShouldIScreen

## 2019-11-19 NOTE — LETTER
November 19, 2019      [place] [place]  [address] [address]        Dear Mayo,    Below are the results of your recent labs:    Results for orders placed or performed in visit on 11/19/19   Prostate Specific Antigen GH     Status: None   Result Value Ref Range    Prostate Specific Antigen 0.136 <3.100 ng/mL   Lipid Panel     Status: Abnormal   Result Value Ref Range    Cholesterol 204 (H) <200 mg/dL    Triglycerides 71 <150 mg/dL    HDL Cholesterol 50 23 - 92 mg/dL    LDL Cholesterol Calculated 140 (H) <100 mg/dL    Non HDL Cholesterol 154 (H) <130 mg/dL   Comprehensive Metabolic Panel     Status: Abnormal   Result Value Ref Range    Sodium 139 134 - 144 mmol/L    Potassium 3.6 3.5 - 5.1 mmol/L    Chloride 102 98 - 107 mmol/L    Carbon Dioxide 28 21 - 31 mmol/L    Anion Gap 9 3 - 14 mmol/L    Glucose 112 (H) 70 - 105 mg/dL    Urea Nitrogen 22 7 - 25 mg/dL    Creatinine 0.91 0.70 - 1.30 mg/dL    GFR Estimate 82 >60 mL/min/[1.73_m2]    GFR Estimate If Black >90 >60 mL/min/[1.73_m2]    Calcium 9.4 8.6 - 10.3 mg/dL    Bilirubin Total 0.6 0.3 - 1.0 mg/dL    Albumin 4.4 3.5 - 5.7 g/dL    Protein Total 7.4 6.4 - 8.9 g/dL    Alkaline Phosphatase 84 34 - 104 U/L    ALT 11 7 - 52 U/L    AST 14 13 - 39 U/L        Your blood tests look fine in general.  Your cholesterol and blood sugar or glucose are a little bit high so work on a healthy diet to keep this as low as possible.  If you have any questions about your results, feel free to contact me.    Sincerely,        Esequiel Geller MD  Internal Medicine  [facility] and [facility]

## 2019-11-19 NOTE — NURSING NOTE
"The patient is here today to establish care and get a refill on his medications  Aleida Clancy LPN on 11/19/2019 at 10:16 AM  Chief Complaint   Patient presents with     Recheck Medication       Initial /78 (BP Location: Right arm, Patient Position: Sitting, Cuff Size: Adult Regular)   Pulse 64   Temp 98.1  F (36.7  C) (Tympanic)   Resp 18   Ht 1.626 m (5' 4\")   Wt 68 kg (150 lb)   BMI 25.75 kg/m   Estimated body mass index is 25.75 kg/m  as calculated from the following:    Height as of this encounter: 1.626 m (5' 4\").    Weight as of this encounter: 68 kg (150 lb).  Medication Reconciliation: complete    Aleida Clancy LPN    "

## 2019-11-22 ENCOUNTER — HOSPITAL ENCOUNTER (OUTPATIENT)
Dept: ULTRASOUND IMAGING | Facility: OTHER | Age: 74
Discharge: HOME | End: 2019-11-22
Attending: INTERNAL MEDICINE | Admitting: INTERNAL MEDICINE
Payer: COMMERCIAL

## 2019-11-22 DIAGNOSIS — Z13.6 SCREENING FOR AAA (ABDOMINAL AORTIC ANEURYSM): ICD-10-CM

## 2019-11-22 DIAGNOSIS — Z87.891 HISTORY OF SMOKING: ICD-10-CM

## 2019-11-22 PROCEDURE — 76706 US ABDL AORTA SCREEN AAA: CPT

## 2020-12-04 ENCOUNTER — TELEPHONE (OUTPATIENT)
Dept: INTERNAL MEDICINE | Facility: OTHER | Age: 75
End: 2020-12-04

## 2020-12-04 NOTE — TELEPHONE ENCOUNTER
Called patient and verified name and date of birth. He was requesting refills of his medication before he leaves in January for 3 months. I told him he should come in and be seen. Scheduled for next Friday, 12/11 at 8:20 am.  Elizabeth Sneed LPN on 12/4/2020 at 3:20 PM

## 2020-12-11 ENCOUNTER — OFFICE VISIT (OUTPATIENT)
Dept: INTERNAL MEDICINE | Facility: OTHER | Age: 75
End: 2020-12-11
Attending: INTERNAL MEDICINE
Payer: COMMERCIAL

## 2020-12-11 VITALS
TEMPERATURE: 98 F | HEART RATE: 84 BPM | BODY MASS INDEX: 26.22 KG/M2 | RESPIRATION RATE: 18 BRPM | SYSTOLIC BLOOD PRESSURE: 136 MMHG | WEIGHT: 148 LBS | HEIGHT: 63 IN | DIASTOLIC BLOOD PRESSURE: 74 MMHG

## 2020-12-11 DIAGNOSIS — R73.9 HYPERGLYCEMIA: ICD-10-CM

## 2020-12-11 DIAGNOSIS — I10 ESSENTIAL HYPERTENSION: Primary | ICD-10-CM

## 2020-12-11 DIAGNOSIS — Z86.0101 H/O ADENOMATOUS POLYP OF COLON: ICD-10-CM

## 2020-12-11 DIAGNOSIS — A80.9 POLIOMYELITIS: ICD-10-CM

## 2020-12-11 DIAGNOSIS — E78.5 HYPERLIPIDEMIA, UNSPECIFIED HYPERLIPIDEMIA TYPE: ICD-10-CM

## 2020-12-11 DIAGNOSIS — N42.9 DISORDER OF PROSTATE, UNSPECIFIED: ICD-10-CM

## 2020-12-11 DIAGNOSIS — F41.9 ANXIETY DISORDER, UNSPECIFIED TYPE: ICD-10-CM

## 2020-12-11 LAB
ALBUMIN SERPL-MCNC: 4.3 G/DL (ref 3.5–5.7)
ALP SERPL-CCNC: 53 U/L (ref 34–104)
ALT SERPL W P-5'-P-CCNC: 16 U/L (ref 7–52)
ANION GAP SERPL CALCULATED.3IONS-SCNC: 8 MMOL/L (ref 3–14)
AST SERPL W P-5'-P-CCNC: 18 U/L (ref 13–39)
BILIRUB SERPL-MCNC: 0.6 MG/DL (ref 0.3–1)
BUN SERPL-MCNC: 16 MG/DL (ref 7–25)
CALCIUM SERPL-MCNC: 9.6 MG/DL (ref 8.6–10.3)
CHLORIDE SERPL-SCNC: 103 MMOL/L (ref 98–107)
CHOLEST SERPL-MCNC: 206 MG/DL
CO2 SERPL-SCNC: 26 MMOL/L (ref 21–31)
CREAT SERPL-MCNC: 0.9 MG/DL (ref 0.7–1.3)
GFR SERPL CREATININE-BSD FRML MDRD: 83 ML/MIN/{1.73_M2}
GLUCOSE SERPL-MCNC: 122 MG/DL (ref 70–105)
HDLC SERPL-MCNC: 50 MG/DL (ref 23–92)
LDLC SERPL CALC-MCNC: 138 MG/DL
NONHDLC SERPL-MCNC: 156 MG/DL
POTASSIUM SERPL-SCNC: 3.8 MMOL/L (ref 3.5–5.1)
PROT SERPL-MCNC: 6.7 G/DL (ref 6.4–8.9)
PSA SERPL-MCNC: 0.12 NG/ML
SODIUM SERPL-SCNC: 137 MMOL/L (ref 134–144)
TRIGL SERPL-MCNC: 89 MG/DL

## 2020-12-11 PROCEDURE — 99215 OFFICE O/P EST HI 40 MIN: CPT | Performed by: INTERNAL MEDICINE

## 2020-12-11 PROCEDURE — 86803 HEPATITIS C AB TEST: CPT | Mod: ZL | Performed by: INTERNAL MEDICINE

## 2020-12-11 PROCEDURE — 36415 COLL VENOUS BLD VENIPUNCTURE: CPT | Mod: ZL | Performed by: INTERNAL MEDICINE

## 2020-12-11 PROCEDURE — G0463 HOSPITAL OUTPT CLINIC VISIT: HCPCS

## 2020-12-11 PROCEDURE — 84153 ASSAY OF PSA TOTAL: CPT | Mod: ZL | Performed by: INTERNAL MEDICINE

## 2020-12-11 PROCEDURE — 80053 COMPREHEN METABOLIC PANEL: CPT | Mod: ZL | Performed by: INTERNAL MEDICINE

## 2020-12-11 PROCEDURE — 80061 LIPID PANEL: CPT | Mod: ZL | Performed by: INTERNAL MEDICINE

## 2020-12-11 RX ORDER — LISINOPRIL/HYDROCHLOROTHIAZIDE 10-12.5 MG
1 TABLET ORAL DAILY
Qty: 90 TABLET | Refills: 3 | Status: SHIPPED | OUTPATIENT
Start: 2020-12-11 | End: 2021-12-13

## 2020-12-11 RX ORDER — BUPROPION HYDROCHLORIDE 300 MG/1
300 TABLET ORAL DAILY
Qty: 90 TABLET | Refills: 3 | Status: SHIPPED | OUTPATIENT
Start: 2020-12-11 | End: 2021-12-13

## 2020-12-11 ASSESSMENT — ENCOUNTER SYMPTOMS
UNEXPECTED WEIGHT CHANGE: 0
DIZZINESS: 0
DYSURIA: 0
DIAPHORESIS: 0
SINUS PAIN: 0
RHINORRHEA: 0
BACK PAIN: 0
SORE THROAT: 0
EYE REDNESS: 0
BLOOD IN STOOL: 0
HALLUCINATIONS: 0
NUMBNESS: 0
EYE PAIN: 0
NERVOUS/ANXIOUS: 0
SPEECH DIFFICULTY: 0
WEAKNESS: 0
HEADACHES: 0
WOUND: 0
CONSTIPATION: 0
AGITATION: 0
ABDOMINAL DISTENTION: 0
SHORTNESS OF BREATH: 0
ABDOMINAL PAIN: 0
JOINT SWELLING: 0
DIFFICULTY URINATING: 0
COUGH: 0
FLANK PAIN: 0
NAUSEA: 0
LIGHT-HEADEDNESS: 0
NECK STIFFNESS: 0
WHEEZING: 0
VOICE CHANGE: 0
CHEST TIGHTNESS: 0
ADENOPATHY: 0
VOMITING: 0
SLEEP DISTURBANCE: 0
CONFUSION: 0
DIARRHEA: 0
APPETITE CHANGE: 0
NECK PAIN: 0
HEMATURIA: 0
BRUISES/BLEEDS EASILY: 0
TROUBLE SWALLOWING: 0
FATIGUE: 0
FEVER: 0
SINUS PRESSURE: 0
TREMORS: 0
PALPITATIONS: 0
SEIZURES: 0
CHILLS: 0
FREQUENCY: 0

## 2020-12-11 ASSESSMENT — PAIN SCALES - GENERAL: PAINLEVEL: NO PAIN (0)

## 2020-12-11 ASSESSMENT — MIFFLIN-ST. JEOR: SCORE: 1311.45

## 2020-12-11 ASSESSMENT — PATIENT HEALTH QUESTIONNAIRE - PHQ9: SUM OF ALL RESPONSES TO PHQ QUESTIONS 1-9: 2

## 2020-12-11 NOTE — LETTER
December 14, 2020      [facility] [place]  [address] [address]        Dear Mayo,    Below are the results of your recent labs:    Results for orders placed or performed in visit on 12/11/20   Hemoglobin A1c     Status: None   Result Value Ref Range    Hemoglobin A1C 5.5 4.0 - 6.0 %   Results for orders placed or performed in visit on 12/11/20   Prostate Specific Antigen GH     Status: None   Result Value Ref Range    Prostate Specific Antigen 0.116 <3.100 ng/mL   Hepatitis C antibody     Status: None   Result Value Ref Range    Hepatitis C Antibody Nonreactive NR^Nonreactive   Lipid Profile     Status: Abnormal   Result Value Ref Range    Cholesterol 206 (H) <200 mg/dL    Triglycerides 89 <150 mg/dL    HDL Cholesterol 50 23 - 92 mg/dL    LDL Cholesterol Calculated 138 (H) <100 mg/dL    Non HDL Cholesterol 156 (H) <130 mg/dL   Comprehensive metabolic panel     Status: Abnormal   Result Value Ref Range    Sodium 137 134 - 144 mmol/L    Potassium 3.8 3.5 - 5.1 mmol/L    Chloride 103 98 - 107 mmol/L    Carbon Dioxide 26 21 - 31 mmol/L    Anion Gap 8 3 - 14 mmol/L    Glucose 122 (H) 70 - 105 mg/dL    Urea Nitrogen 16 7 - 25 mg/dL    Creatinine 0.90 0.70 - 1.30 mg/dL    GFR Estimate 83 >60 mL/min/[1.73_m2]    GFR Estimate If Black >90 >60 mL/min/[1.73_m2]    Calcium 9.6 8.6 - 10.3 mg/dL    Bilirubin Total 0.6 0.3 - 1.0 mg/dL    Albumin 4.3 3.5 - 5.7 g/dL    Protein Total 6.7 6.4 - 8.9 g/dL    Alkaline Phosphatase 53 34 - 104 U/L    ALT 16 7 - 52 U/L    AST 18 13 - 39 U/L        Your cholesterol and glucose or diabetes test are both slightly elevated but everything else looks normal.  Keep working on healthy diet to keep these values as low as possible.  If you have any questions about your results, feel free to contact me.    Sincerely,        Esequiel Geller MD  Internal Medicine  [facility] and [facility]

## 2020-12-11 NOTE — NURSING NOTE
"Chief Complaint   Patient presents with     Physical       Initial BP (!) 140/76 (BP Location: Right arm, Patient Position: Sitting, Cuff Size: Adult Regular)   Pulse 84   Temp 98  F (36.7  C) (Tympanic)   Resp 18   Ht 1.6 m (5' 3\")   Wt 67.1 kg (148 lb)   BMI 26.22 kg/m   Estimated body mass index is 26.22 kg/m  as calculated from the following:    Height as of this encounter: 1.6 m (5' 3\").    Weight as of this encounter: 67.1 kg (148 lb).  Medication Reconciliation: complete    Elizabeth Sneed LPN  "

## 2020-12-11 NOTE — PROGRESS NOTES
Chief Complaint:  This patient is here for a comprehensive review of their multiple medical problems, renewal of medications and update on necessary health maintenance issues.      HPI: This patient is here today for complete evaluation and a review of his chronic medical problems.  He has a history of hypertension.  He is on single drug therapy for control of his blood pressure.  He does not have any endorgan disease as a result of his hypertension.  He also has some hyperlipidemia which is not treated.  He does not have any known vascular disease as a result of this.    He has a history of polio that was years ago.  He has no further problems with that.  He has a history of depression as well as anxiety.  He is under more stress lately because his son who lives in the [place] area recently overdosed on opioids and alcohol and this is still an inpatient in the hospital.  He is trying to cope with this and the best way that he can.    The patient has some urine frequency and would like to have a PSA done.  He has a history of adenomatous polyps of the colon but is up-to-date with colonoscopy.  He will need another in 2022.    Medications are reconciled.  Past medical history, past surgical history, family history and social history are reviewed and updated.  Immunizations are reviewed, I suggested to him that he get the Shingrix vaccine.    Past Medical History:   Diagnosis Date     Acute poliomyelitis     No Comments Provided     Anxiety disorder     02/07     Basal cell carcinoma of skin     1/8/2015     Benign essential hypertension      Decreased libido     No Comments Provided     Diverticulosis of large intestine without perforation or abscess without bleeding     3/23/2015     Enlarged prostate without lower urinary tract symptoms (luts)     02/07,Mild prostatism     Major depressive disorder, single episode     02/07     Mixed hyperlipidemia      Tubular adenoma of colon      Uncomplicated alcohol  dependence (H)     Quit        Past Surgical History:   Procedure Laterality Date     ANKLE SURGERY Right     For polio, age 14     ARTHROSCOPY SHOULDER Bilateral      COLONOSCOPY      2003,normal     COLONOSCOPY      3/23/15,F/U 12 months     COLONOSCOPY  2016,F/U 2019     COLONOSCOPY  2019    F/U  advanced tubular adenoma cecum     OPEN REDUCTION INTERNAL FIXATION WRIST Left        Current Outpatient Medications   Medication Sig Dispense Refill     buPROPion (WELLBUTRIN XL) 300 MG 24 hr tablet Take 1 tablet (300 mg) by mouth daily 120 tablet 2     lisinopril-hydrochlorothiazide (PRINZIDE/ZESTORETIC) 10-12.5 MG tablet Take 1 tablet by mouth daily 120 tablet 2     Multiple Vitamins-Minerals (ONE-A-DAY MENS 50+ ADVANTAGE) TABS Take 1 tablet by mouth daily       triamcinolone (KENALOG) 0.5 % cream Apply sparingly to affected area three times daily. 30 g 0       Allergies   Allergen Reactions     Banana Unknown     Bee Venom Unknown     Kiwi Unknown     Nuts Unknown     Prunus Persica Unknown     Can't eat fresh peach       Family History   Problem Relation Age of Onset     Arthritis Mother      Heart Disease Father         Heart Disease, at age 66 of peripheral vascular disease with BK amputations bilaterally.  He was not diabetic, but had high cholesterol and was a smoker.     Family History Negative Other         Good Health,13 siblings, all A & W.  Several treated for depression.     Breast Cancer Sister      Heart Disease Brother         MI      Heart Disease Sister         Valve surgery     Anesthesia Reaction No family hx of        Social History     Socioeconomic History     Marital status:      Spouse name: Not on file     Number of children: Not on file     Years of education: Not on file     Highest education level: Not on file   Occupational History     Not on file   Social Needs     Financial resource strain: Not on file     Food insecurity     Worry: Not on  file     Inability: Not on file     Transportation needs     Medical: Not on file     Non-medical: Not on file   Tobacco Use     Smoking status: Former Smoker     Packs/day: 0.50     Years: 10.00     Pack years: 5.00     Types: Cigarettes     Quit date: 2006     Years since quittin.5     Smokeless tobacco: Never Used   Substance and Sexual Activity     Alcohol use: No     Comment: None since      Drug use: No     Comment: Drug use: No     Sexual activity: Not Currently   Lifestyle     Physical activity     Days per week: Not on file     Minutes per session: Not on file     Stress: Not on file   Relationships     Social connections     Talks on phone: Not on file     Gets together: Not on file     Attends Jainism service: Not on file     Active member of club or organization: Not on file     Attends meetings of clubs or organizations: Not on file     Relationship status: Not on file     Intimate partner violence     Fear of current or ex partner: Not on file     Emotionally abused: Not on file     Physically abused: Not on file     Forced sexual activity: Not on file   Other Topics Concern     Parent/sibling w/ CABG, MI or angioplasty before 65F 55M? Not Asked   Social History Narrative    , lives in town.  Recovered alcoholic.  Worked 34 years for [employer], then 10 years for [employer].  [employer] in [facility].       Review of Systems   Constitutional: Negative for appetite change, chills, diaphoresis, fatigue, fever and unexpected weight change.   HENT: Negative for ear pain, rhinorrhea, sinus pressure, sinus pain, sore throat, trouble swallowing and voice change.    Eyes: Negative for pain, redness and visual disturbance.   Respiratory: Negative for cough, chest tightness, shortness of breath and wheezing.    Cardiovascular: Negative for chest pain, palpitations and leg swelling.   Gastrointestinal: Negative for abdominal distention, abdominal pain, blood in stool, constipation, diarrhea,  nausea and vomiting.   Endocrine: Negative for cold intolerance and heat intolerance.   Genitourinary: Negative for difficulty urinating, dysuria, flank pain, frequency and hematuria.   Musculoskeletal: Negative for back pain, joint swelling, neck pain and neck stiffness.   Skin: Negative for rash and wound.   Allergic/Immunologic: Negative for immunocompromised state.   Neurological: Negative for dizziness, tremors, seizures, syncope, speech difficulty, weakness, light-headedness, numbness and headaches.   Hematological: Negative for adenopathy. Does not bruise/bleed easily.   Psychiatric/Behavioral: Negative for agitation, behavioral problems, confusion, hallucinations and sleep disturbance. The patient is not nervous/anxious.        Physical Exam  Vitals signs and nursing note reviewed.   Constitutional:       General: He is not in acute distress.     Appearance: He is well-developed. He is not diaphoretic.   HENT:      Head: Normocephalic.      Right Ear: Tympanic membrane, ear canal and external ear normal.      Left Ear: Tympanic membrane, ear canal and external ear normal.      Nose: Nose normal.      Mouth/Throat:      Pharynx: No oropharyngeal exudate.   Eyes:      Conjunctiva/sclera: Conjunctivae normal.      Pupils: Pupils are equal, round, and reactive to light.   Neck:      Musculoskeletal: Normal range of motion and neck supple.      Thyroid: No thyroid mass or thyromegaly.      Vascular: Normal carotid pulses. No carotid bruit or JVD.      Trachea: No tracheal deviation.   Cardiovascular:      Rate and Rhythm: Normal rate and regular rhythm.      Heart sounds: Normal heart sounds. No murmur. No friction rub. No gallop.    Pulmonary:      Effort: Pulmonary effort is normal. No respiratory distress.      Breath sounds: Normal breath sounds. No stridor. No decreased breath sounds, wheezing, rhonchi or rales.   Abdominal:      General: Bowel sounds are normal. There is no distension.      Palpations:  Abdomen is soft. There is no mass.      Tenderness: There is no abdominal tenderness. There is no guarding or rebound.      Hernia: No hernia is present.   Genitourinary:     Penis: Normal.       Testes: Normal.      Prostate: Normal.      Rectum: Normal.   Musculoskeletal: Normal range of motion.      Right lower leg: No edema.      Left lower leg: No edema.      Comments: Right leg atrophic compared to the left   Lymphadenopathy:      Cervical: No cervical adenopathy.   Skin:     General: Skin is warm and dry.      Findings: No rash.   Neurological:      Mental Status: He is alert and oriented to person, place, and time.      Cranial Nerves: No cranial nerve deficit.      Sensory: No sensory deficit.      Motor: No abnormal muscle tone.      Coordination: Coordination normal.      Deep Tendon Reflexes: Reflexes normal.         Assessment:      ICD-10-CM    1. Essential hypertension  I10 Comprehensive metabolic panel     Hepatitis C antibody   2. Poliomyelitis  A80.9    3. H/O adenomatous polyp of colon  Z86.010    4. Anxiety disorder, unspecified type  F41.9    5. Hyperlipidemia, unspecified hyperlipidemia type  E78.5 Lipid Profile   6. Disorder of prostate, unspecified   N42.9 Prostate Specific Antigen GH        Plan: Overall he appears to be doing well from a physical standpoint.  Medications will continue without change.  Complete lab drawn and pending, I will send him a letter with the results.  I encouraged him to look into getting the Shingrix vaccine and he will do that before heading to [place] for the winter.  We talked about his issues with his son which is causing him a lot of stress and depression.  He was thinking about maybe getting some counseling but since he will be leaving on January 3, there really is not much time for that and he will see how things go.  Assuming all goes well, follow-up with me annually.

## 2020-12-14 LAB — HCV AB SERPL QL IA: NONREACTIVE

## 2021-06-02 ENCOUNTER — OFFICE VISIT (OUTPATIENT)
Dept: INTERNAL MEDICINE | Facility: OTHER | Age: 76
End: 2021-06-02
Attending: INTERNAL MEDICINE
Payer: COMMERCIAL

## 2021-06-02 VITALS
TEMPERATURE: 97.8 F | RESPIRATION RATE: 18 BRPM | OXYGEN SATURATION: 99 % | WEIGHT: 150.6 LBS | BODY MASS INDEX: 26.68 KG/M2 | SYSTOLIC BLOOD PRESSURE: 132 MMHG | HEART RATE: 93 BPM | DIASTOLIC BLOOD PRESSURE: 80 MMHG

## 2021-06-02 DIAGNOSIS — F41.9 ANXIETY DISORDER, UNSPECIFIED TYPE: Primary | ICD-10-CM

## 2021-06-02 DIAGNOSIS — E78.49 OTHER HYPERLIPIDEMIA: ICD-10-CM

## 2021-06-02 DIAGNOSIS — I10 ESSENTIAL HYPERTENSION: ICD-10-CM

## 2021-06-02 PROCEDURE — 99214 OFFICE O/P EST MOD 30 MIN: CPT | Performed by: INTERNAL MEDICINE

## 2021-06-02 PROCEDURE — G0463 HOSPITAL OUTPT CLINIC VISIT: HCPCS

## 2021-06-02 ASSESSMENT — ENCOUNTER SYMPTOMS
ALLERGIC/IMMUNOLOGIC NEGATIVE: 1
CONSTITUTIONAL NEGATIVE: 1
HEMATOLOGIC/LYMPHATIC NEGATIVE: 1
ENDOCRINE NEGATIVE: 1

## 2021-06-02 ASSESSMENT — PATIENT HEALTH QUESTIONNAIRE - PHQ9: SUM OF ALL RESPONSES TO PHQ QUESTIONS 1-9: 5

## 2021-06-02 ASSESSMENT — PAIN SCALES - GENERAL: PAINLEVEL: NO PAIN (0)

## 2021-06-02 NOTE — PROGRESS NOTES
Chief Complaint:  Not feeling well.    HPI: He is in today for a couple of things.  First of all, he was given a different formulation of Wellbutrin from the pharmacy because they apparently did not have his usual formulation of Wellbutrin.  He did not feel well when he was taking that.  He has since gone back to the old preparation and he is feeling back to normal.  He admits that he had a lot of troubles with anxiety and depression and a lack of motivation.  That is all improving at this time.    He is also worried about his heart.  His brother just had bypass surgery.  The patient does have some nausea in the morning on occasion and that is apparently the type of symptoms that his brother has.  The patient has no previous history of heart disease but he does have untreated hyperlipidemia and treated hypertension.  He does not have any chest pain per se.    Past Medical History:   Diagnosis Date     Acute poliomyelitis     No Comments Provided     Anxiety disorder     02/07     Basal cell carcinoma of skin     1/8/2015     Benign essential hypertension      Decreased libido     No Comments Provided     Diverticulosis of large intestine without perforation or abscess without bleeding     3/23/2015     Enlarged prostate without lower urinary tract symptoms (luts)     02/07,Mild prostatism     Major depressive disorder, single episode     02/07     Mixed hyperlipidemia      Tubular adenoma of colon      Uncomplicated alcohol dependence (H)     Quit 2002       Past Surgical History:   Procedure Laterality Date     ANKLE SURGERY Right     For polio, age 14     ARTHROSCOPY SHOULDER Bilateral      COLONOSCOPY      02/04/2003,normal     COLONOSCOPY      3/23/15,F/U 12 months     COLONOSCOPY  04/01/2016 4/1/16,F/U 2019     COLONOSCOPY  04/22/2019    F/U 2022 advanced tubular adenoma cecum     OPEN REDUCTION INTERNAL FIXATION WRIST Left        Allergies   Allergen Reactions     Banana Unknown     Bee Venom Unknown      Kiwi Unknown     Nuts Unknown     Prunus Persica Unknown     Can't eat fresh peach       Current Outpatient Medications   Medication Sig Dispense Refill     buPROPion (WELLBUTRIN XL) 300 MG 24 hr tablet Take 1 tablet (300 mg) by mouth daily 90 tablet 3     lisinopril-hydrochlorothiazide (ZESTORETIC) 10-12.5 MG tablet Take 1 tablet by mouth daily 90 tablet 3     Multiple Vitamins-Minerals (ONE-A-DAY MENS 50+ ADVANTAGE) TABS Take 1 tablet by mouth daily       triamcinolone (KENALOG) 0.5 % cream Apply sparingly to affected area three times daily. 30 g 0       Social History     Socioeconomic History     Marital status:      Spouse name: Not on file     Number of children: Not on file     Years of education: Not on file     Highest education level: Not on file   Occupational History     Not on file   Social Needs     Financial resource strain: Not on file     Food insecurity     Worry: Not on file     Inability: Not on file     Transportation needs     Medical: Not on file     Non-medical: Not on file   Tobacco Use     Smoking status: Former Smoker     Packs/day: 0.50     Years: 10.00     Pack years: 5.00     Types: Cigarettes     Quit date: 2006     Years since quittin.9     Smokeless tobacco: Never Used   Substance and Sexual Activity     Alcohol use: No     Comment: None since      Drug use: No     Comment: Drug use: No     Sexual activity: Not Currently   Lifestyle     Physical activity     Days per week: Not on file     Minutes per session: Not on file     Stress: Not on file   Relationships     Social connections     Talks on phone: Not on file     Gets together: Not on file     Attends Amish service: Not on file     Active member of club or organization: Not on file     Attends meetings of clubs or organizations: Not on file     Relationship status: Not on file     Intimate partner violence     Fear of current or ex partner: Not on file     Emotionally abused: Not on file     Physically  abused: Not on file     Forced sexual activity: Not on file   Other Topics Concern     Parent/sibling w/ CABG, MI or angioplasty before 65F 55M? Not Asked   Social History Narrative    , lives in town.  Recovered alcoholic.  Worked 34 years for [employer], then 10 years for [employer].  [employer] in [facility].       Review of Systems   Constitutional: Negative.    Endocrine: Negative.    Skin: Negative.    Allergic/Immunologic: Negative.    Hematological: Negative.        Physical Exam  Vitals signs and nursing note reviewed.   Constitutional:       General: He is not in acute distress.     Appearance: Normal appearance. He is not ill-appearing, toxic-appearing or diaphoretic.   Neurological:      Mental Status: He is alert.         Assessment:      ICD-10-CM    1. Anxiety disorder, unspecified type  F41.9    2. Other hyperlipidemia  E78.49 Echo Stress Echocardiogram   3. Essential hypertension  I10 Echo Stress Echocardiogram       Plan: In regards to his bupropion, he obviously needs to stay on the same brand that he has been on chronically.  I told him to point this out to his pharmacist and make sure he gets the correct brand because what ever they gave him before was not effective.    In regards to his cardiac concerns, it seems reasonable to proceed with stress testing.  His last EKG in 2019 showed sinus rhythm with nonspecific ST changes.  I will get him scheduled for a stress echo in the near future and we will proceed from there as indicated depending on the results.

## 2021-06-02 NOTE — NURSING NOTE
"Chief Complaint   Patient presents with     Other     Not feeling right       Initial /80   Pulse 93   Temp 97.8  F (36.6  C) (Tympanic)   Resp 18   Wt 68.3 kg (150 lb 9.6 oz)   SpO2 99%   BMI 26.68 kg/m   Estimated body mass index is 26.68 kg/m  as calculated from the following:    Height as of 12/11/20: 1.6 m (5' 3\").    Weight as of this encounter: 68.3 kg (150 lb 9.6 oz).  Medication Reconciliation: complete    Carlos Ledezma LPN    "

## 2021-07-09 ENCOUNTER — TELEPHONE (OUTPATIENT)
Dept: CARDIOLOGY | Facility: OTHER | Age: 76
End: 2021-07-09

## 2021-07-09 NOTE — TELEPHONE ENCOUNTER
Message left on both numbers with our direct number given.  [phone] reminder call for appointment on 7/13/21 at 14:00.

## 2021-07-13 ENCOUNTER — HOSPITAL ENCOUNTER (OUTPATIENT)
Dept: CARDIOLOGY | Facility: OTHER | Age: 76
Discharge: HOME | End: 2021-07-13
Attending: INTERNAL MEDICINE | Admitting: INTERNAL MEDICINE
Payer: COMMERCIAL

## 2021-07-13 DIAGNOSIS — E78.49 OTHER HYPERLIPIDEMIA: ICD-10-CM

## 2021-07-13 DIAGNOSIS — I10 ESSENTIAL HYPERTENSION: ICD-10-CM

## 2021-07-13 PROCEDURE — 93016 CV STRESS TEST SUPVJ ONLY: CPT | Performed by: INTERNAL MEDICINE

## 2021-07-13 PROCEDURE — 93321 DOPPLER ECHO F-UP/LMTD STD: CPT | Mod: 26 | Performed by: STUDENT IN AN ORGANIZED HEALTH CARE EDUCATION/TRAINING PROGRAM

## 2021-07-13 PROCEDURE — 93325 DOPPLER ECHO COLOR FLOW MAPG: CPT | Mod: 26 | Performed by: STUDENT IN AN ORGANIZED HEALTH CARE EDUCATION/TRAINING PROGRAM

## 2021-07-13 PROCEDURE — 93018 CV STRESS TEST I&R ONLY: CPT | Performed by: INTERNAL MEDICINE

## 2021-07-13 PROCEDURE — 93350 STRESS TTE ONLY: CPT | Mod: 26 | Performed by: STUDENT IN AN ORGANIZED HEALTH CARE EDUCATION/TRAINING PROGRAM

## 2021-07-13 PROCEDURE — 93017 CV STRESS TEST TRACING ONLY: CPT

## 2021-07-13 PROCEDURE — 255N000002 HC RX 255 OP 636: Performed by: INTERNAL MEDICINE

## 2021-07-13 RX ADMIN — PERFLUTREN 5 ML: 6.52 INJECTION, SUSPENSION INTRAVENOUS at 15:03

## 2021-07-13 NOTE — PROGRESS NOTES
13:55 The patient arrived for a stress echo.  The procedure, risks and benefits were discussed and the consent was signed.  The patient was prepped for the stress test, and an IV was placed per procedure.  The echo sonographer did the initial images with definity for image enhancement.   Dr. Geller arrived, and the patient biked 6 minutes and 16 seconds.  The patient tolerated the procedure.  Stress images were completed and the IV was removed per procedure.  The patient was released in stable condition.  The patient was instructed that the ordering MD will call with results in one to two days.  If you have not received your results within 3 days please call the ordering provider.  Please see the chart for complete test results.

## 2021-10-11 ENCOUNTER — HOSPITAL ENCOUNTER (OUTPATIENT)
Dept: GENERAL RADIOLOGY | Facility: OTHER | Age: 76
End: 2021-10-11
Attending: FAMILY MEDICINE
Payer: COMMERCIAL

## 2021-10-11 ENCOUNTER — OFFICE VISIT (OUTPATIENT)
Dept: FAMILY MEDICINE | Facility: OTHER | Age: 76
End: 2021-10-11
Attending: FAMILY MEDICINE
Payer: COMMERCIAL

## 2021-10-11 VITALS
HEART RATE: 107 BPM | HEIGHT: 64 IN | DIASTOLIC BLOOD PRESSURE: 70 MMHG | SYSTOLIC BLOOD PRESSURE: 136 MMHG | OXYGEN SATURATION: 97 % | BODY MASS INDEX: 25.88 KG/M2 | WEIGHT: 151.6 LBS | TEMPERATURE: 98.1 F | RESPIRATION RATE: 16 BRPM

## 2021-10-11 DIAGNOSIS — M62.89 MUSCULAR IMBALANCE: ICD-10-CM

## 2021-10-11 DIAGNOSIS — G14 POST-POLIO SYNDROME (H): ICD-10-CM

## 2021-10-11 DIAGNOSIS — M17.12 ARTHRITIS OF LEFT KNEE: Primary | ICD-10-CM

## 2021-10-11 PROCEDURE — 73560 X-RAY EXAM OF KNEE 1 OR 2: CPT | Mod: RT

## 2021-10-11 PROCEDURE — G0463 HOSPITAL OUTPT CLINIC VISIT: HCPCS | Mod: 25

## 2021-10-11 PROCEDURE — 99214 OFFICE O/P EST MOD 30 MIN: CPT | Performed by: FAMILY MEDICINE

## 2021-10-11 PROCEDURE — G0463 HOSPITAL OUTPT CLINIC VISIT: HCPCS

## 2021-10-11 RX ORDER — IBUPROFEN 200 MG
200 TABLET ORAL EVERY 4 HOURS PRN
COMMUNITY

## 2021-10-11 RX ORDER — NAPROXEN 500 MG/1
500 TABLET ORAL 2 TIMES DAILY PRN
Qty: 60 TABLET | Refills: 2 | Status: SHIPPED | OUTPATIENT
Start: 2021-10-11 | End: 2022-11-02

## 2021-10-11 ASSESSMENT — MIFFLIN-ST. JEOR: SCORE: 1334.68

## 2021-10-11 ASSESSMENT — PAIN SCALES - GENERAL: PAINLEVEL: SEVERE PAIN (6)

## 2021-10-11 NOTE — NURSING NOTE
"Chief Complaint   Patient presents with     Knee Pain     left knee pain, ongoing for \"a long time\", pain 6/10, no known injury     Patient presents to clinic today for left knee pain that has been ongoing for \"a long time.\" Pain 6/10. No known injury. He denies any past knee surgery or injections on left knee. He takes Advil as needed.     Initial /70 (BP Location: Right arm, Patient Position: Sitting, Cuff Size: Adult Regular)   Pulse 107   Temp 98.1  F (36.7  C) (Tympanic)   Resp 16   Ht 1.619 m (5' 3.75\")   Wt 68.8 kg (151 lb 9.6 oz)   SpO2 97%   BMI 26.23 kg/m   Estimated body mass index is 26.23 kg/m  as calculated from the following:    Height as of this encounter: 1.619 m (5' 3.75\").    Weight as of this encounter: 68.8 kg (151 lb 9.6 oz).     FOOD SECURITY SCREENING QUESTIONS  Hunger Vital Signs:  Within the past 12 months we worried whether our food would run out before we got money to buy more. Never  Within the past 12 months the food we bought just didn't last and we didn't have money to get more. Never      Medication Reconciliation: Complete      Mariela Delacruz LPN   "

## 2021-10-11 NOTE — PROGRESS NOTES
"HPI:  76-year-old male coming in for evaluation of left knee pain.  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  His muscles are not as developed on the right and his knee sits lower on the right compared to the left.  He reports that his left knee pain is diffuse.  He rates it at 6/10.  It is bothersome when he wakes up in the morning and it takes him a while to \"get going.\"  He characterizes the pain as sharp, achy, and throbbing.  He has tried ice, heat, and CBD oil.  No pain on the right side.      EXAM:  /70 (BP Location: Right arm, Patient Position: Sitting, Cuff Size: Adult Regular)   Pulse 107   Temp 98.1  F (36.7  C) (Tympanic)   Resp 16   Ht 1.619 m (5' 3.75\")   Wt 68.8 kg (151 lb 9.6 oz)   SpO2 97%   BMI 26.23 kg/m    MUSCULOSKELETAL EXAM:  LEFT KNEE  Patient has a difficult time relaxing for the exam  Inspection:  -No gross deformity  -No bruising or soft tissue swelling  -Scars:  None  -Significant size discrepancy between the right and left lower extremity    Tenderness to palpation of the:  -Quadriceps musculature:  Negative  -Quadriceps tendon:  Negative  -Patella:  Negative  -Medial patellar facet:  Negative  -Lateral patellar facet:  Negative  -Inferior pole of the patella:  Negative  -Patellar tendon:  Negative  -Tibial tuberosity:  Negative  -Medial joint line: Mild pain  -Medial collateral ligament:  Negative  -Medial hamstring tendons:  Negative  -Medial femoral condyle:  Negative  -Medial tibial plateau:  Negative  -Pes anserine bursa:  Negative  -Lateral joint line:  Negative  -Distal IT band:  Negative  -Gerdy's tubercle:  Negative  -Lateral collateral ligament:  Negative  -Lateral hamstring tendons:  Negative  -Lateral femoral condyle:  Negative  -Lateral tibial plateau:  Negative  -Posterior lateral corner:  Negative  -Popliteal fossa:  Negative    Range of Motion:  -Passive flexion:  130 with pain at endrange  -Passive " extension:  0    Strength:  -Extension:  5/5  -Flexion:  5/5    Special Tests:  -Effusion:  Absent  -Medial patellar glide:  Negative  -Lateral patellar glide:  Negative  -Patellar apprehension:  Negative  -Valgus stress:  Negative  -Varus stress:  Negative  -Lachman test:  Negative  -Anterior drawer:  Negative  -Posterior drawer:  Negative    Other:  -Intact sensation to light touch distally.  -No signs of cyanosis. Normal skin temperature of the lower extremity.  -Foot/ankle:  No gross deformity. Full range of motion.  -Right knee:  No gross deformity. No palpable tenderness. Normal strength and ROM.      IMAGING:  10/11/2021: 4 view left knee x-ray  -No fracture, dislocation, or bony lesion.  Moderate degenerative changes with associated osteophytosis in the medial tibiofemoral compartment.  Left knee more elevated than right.  Significant size discrepancy of the musculature about the right and left lower extremities.      ASSESSMENT/PLAN:  Diagnoses and all orders for this visit:  Arthritis of left knee  -     XR Knee Standing 2v  Bilateral & 2v Left  -     Physical Therapy Referral; Future  -     naproxen (NAPROSYN) 500 MG tablet; Take 1 tablet (500 mg) by mouth 2 times daily as needed for moderate pain With food  Post-polio syndrome  -     Physical Therapy Referral; Future  Muscular imbalance  -     Physical Therapy Referral; Future    76-year-old male with post polio syndrome with left knee pain consistent with osteoarthritis.  X-rays from 10/11 were personally reviewed in the office with the findings as demonstrated above by my interpretation.  This patient does have chronic anatomical asymmetry which could be a predisposing factor for the patient's symptoms.  We discussed a wide range of treatment options to include activity modification, aerobic activity, physical therapy, topical medications, oral medications, injection therapy, or surgical referral.  -Activities as tolerated  -Referral placed to physical  therapy  -Naproxen 500 mg twice daily as needed with food  -Follow-up after 4-6 weeks of physical therapy  -If symptoms persist, consider CSI      Maynor Valadez MD  10/11/2021  9:51 AM    Total time spent with this patient was 37 minutes which included chart review, visualization and interpretation of images, time spent with the patient, and documentation.

## 2021-12-09 DIAGNOSIS — F41.9 ANXIETY DISORDER, UNSPECIFIED TYPE: ICD-10-CM

## 2021-12-09 DIAGNOSIS — I10 ESSENTIAL HYPERTENSION: ICD-10-CM

## 2021-12-09 NOTE — TELEPHONE ENCOUNTER
MAF-pt wants medication refill. [place]. Please call. Thank you.  Jeannine Glass

## 2021-12-13 ENCOUNTER — TELEPHONE (OUTPATIENT)
Dept: INTERNAL MEDICINE | Facility: OTHER | Age: 76
End: 2021-12-13
Payer: COMMERCIAL

## 2021-12-13 DIAGNOSIS — F41.9 ANXIETY DISORDER, UNSPECIFIED TYPE: ICD-10-CM

## 2021-12-13 DIAGNOSIS — I10 ESSENTIAL HYPERTENSION: ICD-10-CM

## 2021-12-13 RX ORDER — BUPROPION HYDROCHLORIDE 300 MG/1
300 TABLET ORAL DAILY
Qty: 90 TABLET | Refills: 3 | Status: SHIPPED | OUTPATIENT
Start: 2021-12-13 | End: 2021-12-13

## 2021-12-13 RX ORDER — LISINOPRIL/HYDROCHLOROTHIAZIDE 10-12.5 MG
1 TABLET ORAL DAILY
Qty: 90 TABLET | Refills: 3 | Status: SHIPPED | OUTPATIENT
Start: 2021-12-13 | End: 2021-12-13

## 2021-12-13 RX ORDER — BUPROPION HYDROCHLORIDE 300 MG/1
300 TABLET ORAL DAILY
Qty: 90 TABLET | Refills: 0 | Status: SHIPPED | OUTPATIENT
Start: 2021-12-13 | End: 2022-12-28

## 2021-12-13 RX ORDER — LISINOPRIL/HYDROCHLOROTHIAZIDE 10-12.5 MG
1 TABLET ORAL DAILY
Qty: 90 TABLET | Refills: 0 | Status: SHIPPED | OUTPATIENT
Start: 2021-12-13 | End: 2022-12-28

## 2021-12-13 NOTE — TELEPHONE ENCOUNTER
MAF-patient is looking to have some medications refilled he is going on vacation after the first of the year I did offer an appointment but patient did not want that     Please call and advise    Thank You    Yanira Clark on 12/13/2021 at 9:49 AM

## 2022-05-20 ENCOUNTER — ALLIED HEALTH/NURSE VISIT (OUTPATIENT)
Dept: FAMILY MEDICINE | Facility: OTHER | Age: 77
End: 2022-05-20
Attending: SURGERY
Payer: COMMERCIAL

## 2022-05-20 DIAGNOSIS — Z86.0101 H/O ADENOMATOUS POLYP OF COLON: ICD-10-CM

## 2022-05-20 PROCEDURE — C9803 HOPD COVID-19 SPEC COLLECT: HCPCS

## 2022-05-20 PROCEDURE — U0005 INFEC AGEN DETEC AMPLI PROBE: HCPCS | Mod: ZL

## 2022-05-20 NOTE — PROGRESS NOTES
[id number] [address]  [phone]      Cardiology Progress Note      1/10/2018 9:00 AM    Admit Date: 1/1/2018    Admit Diagnosis:   Acute encephalopathy    Subjective:     Chrissy Michele remains intubated, more responsive. Started on CVVH yesterday, pulled 5.5L. Overnight in and out of aflutter, afib and SR.      Visit Vitals    BP 99/58    Pulse 71    Temp 97.5 °F (36.4 °C)    Resp 27    Ht 5' 7\" (1.702 m)    Wt 102.8 kg (226 lb 10.1 oz)    SpO2 95%    BMI 35.5 kg/m2       Current Facility-Administered Medications   Medication Dose Route Frequency    sodium bicarbonate (8.4%) 150 mEq in dextrose 5% 1,000 mL infusion   IntraVENous CONTINUOUS    bisacodyl (DULCOLAX) suppository 10 mg  10 mg Rectal ONCE    insulin glargine (LANTUS) injection 5 Units  5 Units SubCUTAneous BID    albuterol-ipratropium (DUO-NEB) 2.5 MG-0.5 MG/3 ML  3 mL Nebulization Q6H PRN    dexmedeTOMidine (PRECEDEX) 400 mcg in 0.9% sodium chloride 100 mL infusion  0.2-1.4 mcg/kg/hr IntraVENous TITRATE    heparin 25,000 units in D5W 250 ml infusion  9.3-25 Units/kg/hr IntraVENous TITRATE    polyethylene glycol (MIRALAX) packet 17 g  17 g Per NG tube DAILY    heparin (porcine) injection 4,000 Units  4,000 Units IntraVENous Q6H PRN    heparin (porcine) injection 2,000 Units  2,000 Units IntraVENous Q6H PRN    cefepime (MAXIPIME) 2 g in 0.9 %  mL IVPB  2 g IntraVENous Q12H    bicarbonate dialysis (PRISMASOL) BG K 2/Ca 3.5 5000 ml solution   Extracorporeal DIALYSIS CONTINUOUS    famotidine (PF) (PEPCID) 20 mg in sodium chloride 0.9 % 10 mL injection  20 mg IntraVENous Q24H    methylPREDNISolone (PF) (SOLU-MEDROL) injection 40 mg  40 mg IntraVENous Q6H    metoclopramide HCl (REGLAN) injection 5 mg  5 mg IntraVENous Q6H    NOREPINephrine (LEVOPHED) 32 mg in 5% dextrose 250 mL infusion  2-30 mcg/min IntraVENous TITRATE    fentaNYL (PF) 900 mcg/30 ml infusion soln  0-200 mcg/hr IntraVENous Patient here today for Covid test. Procedure 5/23/22.     Pinky Bingham CNA .............................on 5/20/2022 at 8:21 AM     TITRATE    zinc oxide-cod liver oil (DESITIN) 40 % paste   Topical BID    epoprostenol (VELETRI) 30,000 ng/mL in 0.9% sodium chloride 50 mL inhalation solution  50 ng/kg/min (Ideal) Inhalation CONTINUOUS    0.9% sodium chloride inhalation  22.4 mL/hr Inhalation CONTINUOUS    amiodarone (CORDARONE) 900 mg/250 ml D5W infusion  0.5 mg/min IntraVENous TITRATE    sodium chloride (NS) flush 10-30 mL  10-30 mL InterCATHeter PRN    sodium chloride (NS) flush 10-40 mL  10-40 mL InterCATHeter Q8H    sodium chloride (NS) flush 20 mL  20 mL InterCATHeter Q24H    heparin (porcine) pf 300 Units  300 Units InterCATHeter PRN    insulin lispro (HUMALOG) injection   SubCUTAneous Q6H    ondansetron (ZOFRAN) injection 4 mg  4 mg IntraVENous Q4H PRN    labetalol (NORMODYNE;TRANDATE) injection 10 mg  10 mg IntraVENous Q2H PRN    sodium chloride (NS) flush 5-10 mL  5-10 mL IntraVENous PRN    fentaNYL citrate (PF) injection  mcg   mcg IntraVENous Q2H PRN    chlorhexidine (PERIDEX) 0.12 % mouthwash 15 mL  15 mL Oral Q12H    metroNIDAZOLE (FLAGYL) IVPB premix 500 mg  500 mg IntraVENous Q12H    glucose chewable tablet 16 g  4 Tab Oral PRN    dextrose (D50W) injection syrg 12.5-25 g  12.5-25 g IntraVENous PRN    glucagon (GLUCAGEN) injection 1 mg  1 mg IntraMUSCular PRN       Objective:      Physical Exam:  General Appearance:  WNWD elderly appearing  male intubated  Chest:   less coarse  Cardiovascular:  irr no murmur.   Abdomen:   Soft, non-tender, bowel sounds are active.   Extremities: UE +2 edema  Skin:  Warm and dry.     Data Review:   Recent Labs      01/10/18   0425  01/09/18   0407  01/08/18   1143   WBC  29.3*  28.7*  23.5*   HGB  14.0  14.1  11.6*   HCT  43.5  43.2  36.2*   PLT  94*  115*  84*     Recent Labs      01/10/18   0425  01/09/18   1923  01/09/18   1157  01/09/18   0407   01/08/18   0452   NA  137  139  137  135*   < >  139   K  4.6  4.5  5.0  5.5*   < >  5.6*   CL  100  100  99  97 < >  95*   CO2  30  32  31  30   < >  34*   GLU  165*  138*  190*  238*   < >  189*   BUN  64*  72*  82*  89*   < >  124*   CREA  2.44*  2.72*  2.99*  3.56*   < >  4.76*   CA  8.2*  8.0*  8.2*  7.7*   < >  7.7*   MG  2.1  2.1   --   2.3   < >  2.5*   PHOS  4.5  5.0*  5.6*  5.7*  6.5*   < >  7.8*   ALB  1.3*  1.3*  1.3*  1.2*   < >  1.2*   TBILI  0.4   --    --   0.9   --   0.5   SGOT  52*   --    --   56*   --   56*   ALT  25   --    --   29   --   29    < > = values in this interval not displayed. No results for input(s): TROIQ, CPK, CKMB in the last 72 hours. Intake/Output Summary (Last 24 hours) at 01/10/18 1135  Last data filed at 01/10/18 1102   Gross per 24 hour   Intake           4315.2 ml   Output             5941 ml   Net          -1625.8 ml        Telemetry: SR with PACs, aflutter, afib      Assessment:     Principal Problem:    Acute respiratory failure ([address] (1/1/2018)    Active Problems:    Acute encephalopathy (1/1/2018)      Typical atrial flutter ([address] (1/1/2018)      Acute on chronic systolic HF (heart failure) ([address] (1/2/2018)      Acute renal insufficiency (1/2/2018)      SOB (shortness of breath) (1/2/2018)      Abdominal distention (1/2/2018)        Plan:     Aflutter with RVR/afib with RVR:  Continue on IV amio 0.5mg, no further BB as he is requiring pressor support  CHADS2 vasc Score: 2. Eliquis discontinued on 1/7/18 in prep for HD/CVVH.      Volume overload:  Neg 5.5L with CVVH yesterday, continuing  Weight down 12#s in 2 days  Continues with significantly positive fluid balance (16 L for admission)  Nephrology following,  Monitor I/Os, daily weights, labs     Poor prognosis      [address] [facility]    1/10/2018         Agree with note as outlined by  NP. I confirm findings in history and physical exam. No additional findings noted. Agree with plan as outlined above.      Swetha Pierce MD

## 2022-05-21 LAB — SARS-COV-2 RNA RESP QL NAA+PROBE: NEGATIVE

## 2022-05-23 ENCOUNTER — ANESTHESIA (OUTPATIENT)
Dept: SURGERY | Facility: OTHER | Age: 77
End: 2022-05-23
Payer: COMMERCIAL

## 2022-05-23 ENCOUNTER — HOSPITAL ENCOUNTER (OUTPATIENT)
Facility: OTHER | Age: 77
Discharge: HOME | End: 2022-05-23
Attending: SURGERY | Admitting: SURGERY
Payer: COMMERCIAL

## 2022-05-23 ENCOUNTER — ANESTHESIA EVENT (OUTPATIENT)
Dept: SURGERY | Facility: OTHER | Age: 77
End: 2022-05-23
Payer: COMMERCIAL

## 2022-05-23 VITALS
HEART RATE: 78 BPM | WEIGHT: 155 LBS | RESPIRATION RATE: 18 BRPM | HEIGHT: 65 IN | OXYGEN SATURATION: 97 % | SYSTOLIC BLOOD PRESSURE: 128 MMHG | DIASTOLIC BLOOD PRESSURE: 72 MMHG | TEMPERATURE: 98.2 F | BODY MASS INDEX: 25.83 KG/M2

## 2022-05-23 PROCEDURE — 45384 COLONOSCOPY W/LESION REMOVAL: CPT | Mod: PT | Performed by: SURGERY

## 2022-05-23 PROCEDURE — 250N000009 HC RX 250: Performed by: NURSE ANESTHETIST, CERTIFIED REGISTERED

## 2022-05-23 PROCEDURE — 258N000003 HC RX IP 258 OP 636: Performed by: SURGERY

## 2022-05-23 PROCEDURE — 45384 COLONOSCOPY W/LESION REMOVAL: CPT | Performed by: NURSE ANESTHETIST, CERTIFIED REGISTERED

## 2022-05-23 PROCEDURE — 88305 TISSUE EXAM BY PATHOLOGIST: CPT

## 2022-05-23 PROCEDURE — 45380 COLONOSCOPY AND BIOPSY: CPT | Performed by: SURGERY

## 2022-05-23 PROCEDURE — 99100 ANES PT EXTEME AGE<1 YR&>70: CPT | Performed by: NURSE ANESTHETIST, CERTIFIED REGISTERED

## 2022-05-23 PROCEDURE — 250N000011 HC RX IP 250 OP 636: Performed by: NURSE ANESTHETIST, CERTIFIED REGISTERED

## 2022-05-23 RX ORDER — ONDANSETRON 2 MG/ML
4 INJECTION INTRAMUSCULAR; INTRAVENOUS
Status: DISCONTINUED | OUTPATIENT
Start: 2022-05-23 | End: 2022-05-23 | Stop reason: HOSPADM

## 2022-05-23 RX ORDER — FLUMAZENIL 0.1 MG/ML
0.2 INJECTION, SOLUTION INTRAVENOUS
Status: CANCELLED | OUTPATIENT
Start: 2022-05-23 | End: 2022-05-24

## 2022-05-23 RX ORDER — LIDOCAINE 40 MG/G
CREAM TOPICAL
Status: DISCONTINUED | OUTPATIENT
Start: 2022-05-23 | End: 2022-05-23 | Stop reason: HOSPADM

## 2022-05-23 RX ORDER — PROPOFOL 10 MG/ML
INJECTION, EMULSION INTRAVENOUS PRN
Status: DISCONTINUED | OUTPATIENT
Start: 2022-05-23 | End: 2022-05-23

## 2022-05-23 RX ORDER — NALOXONE HYDROCHLORIDE 0.4 MG/ML
0.2 INJECTION, SOLUTION INTRAMUSCULAR; INTRAVENOUS; SUBCUTANEOUS
Status: CANCELLED | OUTPATIENT
Start: 2022-05-23

## 2022-05-23 RX ORDER — SODIUM CHLORIDE, SODIUM LACTATE, POTASSIUM CHLORIDE, CALCIUM CHLORIDE 600; 310; 30; 20 MG/100ML; MG/100ML; MG/100ML; MG/100ML
INJECTION, SOLUTION INTRAVENOUS CONTINUOUS
Status: DISCONTINUED | OUTPATIENT
Start: 2022-05-23 | End: 2022-05-23 | Stop reason: HOSPADM

## 2022-05-23 RX ORDER — PROPOFOL 10 MG/ML
INJECTION, EMULSION INTRAVENOUS CONTINUOUS PRN
Status: DISCONTINUED | OUTPATIENT
Start: 2022-05-23 | End: 2022-05-23

## 2022-05-23 RX ORDER — LIDOCAINE HYDROCHLORIDE 20 MG/ML
INJECTION, SOLUTION INFILTRATION; PERINEURAL PRN
Status: DISCONTINUED | OUTPATIENT
Start: 2022-05-23 | End: 2022-05-23

## 2022-05-23 RX ORDER — NALOXONE HYDROCHLORIDE 0.4 MG/ML
0.4 INJECTION, SOLUTION INTRAMUSCULAR; INTRAVENOUS; SUBCUTANEOUS
Status: CANCELLED | OUTPATIENT
Start: 2022-05-23

## 2022-05-23 RX ORDER — SODIUM CHLORIDE, SODIUM LACTATE, POTASSIUM CHLORIDE, CALCIUM CHLORIDE 600; 310; 30; 20 MG/100ML; MG/100ML; MG/100ML; MG/100ML
INJECTION, SOLUTION INTRAVENOUS CONTINUOUS
Status: CANCELLED | OUTPATIENT
Start: 2022-05-23

## 2022-05-23 RX ADMIN — PROPOFOL 150 MCG/KG/MIN: 10 INJECTION, EMULSION INTRAVENOUS at 13:06

## 2022-05-23 RX ADMIN — SODIUM CHLORIDE, POTASSIUM CHLORIDE, SODIUM LACTATE AND CALCIUM CHLORIDE: 600; 310; 30; 20 INJECTION, SOLUTION INTRAVENOUS at 12:33

## 2022-05-23 RX ADMIN — PROPOFOL 80 MG: 10 INJECTION, EMULSION INTRAVENOUS at 13:06

## 2022-05-23 RX ADMIN — LIDOCAINE HYDROCHLORIDE 40 MG: 20 INJECTION, SOLUTION INFILTRATION; PERINEURAL at 13:05

## 2022-05-23 NOTE — H&P
History and Physical    CHIEF COMPLAINT / REASON FOR PROCEDURE:  H/o polyps    PERTINENT HISTORY   Patient is a 76 year old male who presents today for colonoscopy for h/o polyps.   Last colonoscopy 2019  Patient has no complaints.    Past Medical History:   Diagnosis Date     Acute poliomyelitis     No Comments Provided     Anxiety disorder     02/07     Basal cell carcinoma of skin     1/8/2015     Benign essential hypertension      Decreased libido     No Comments Provided     Diverticulosis of large intestine without perforation or abscess without bleeding     3/23/2015     Enlarged prostate without lower urinary tract symptoms (luts)     02/07,Mild prostatism     Major depressive disorder, single episode     02/07     Mixed hyperlipidemia      Tubular adenoma of colon      Uncomplicated alcohol dependence (H)     Quit 2002     Past Surgical History:   Procedure Laterality Date     ANKLE SURGERY Right     For polio, age 14     ARTHROSCOPY SHOULDER Bilateral      COLONOSCOPY      02/04/2003,normal     COLONOSCOPY      3/23/15,F/U 12 months     COLONOSCOPY  04/01/2016 4/1/16,F/U 2019     COLONOSCOPY  04/22/2019    F/U 2022 advanced tubular adenoma cecum     OPEN REDUCTION INTERNAL FIXATION WRIST Left        Bleeding tendencies:  No    ALLERGIES/SENSITIVITIES:   Allergies   Allergen Reactions     Banana Unknown     Bee Venom Unknown     Kiwi Unknown     Nuts Unknown     Prunus Persica Unknown     Can't eat fresh peach        CURRENT MEDICATIONS:    Prior to Admission medications    Medication Sig Start Date End Date Taking? Authorizing Provider   buPROPion (WELLBUTRIN XL) 300 MG 24 hr tablet Take 1 tablet (300 mg) by mouth daily 12/13/21  Yes Esequiel Geller MD   ibuprofen (ADVIL/MOTRIN) 200 MG tablet Take 200 mg by mouth every 4 hours as needed for mild pain   Yes Reported, Patient   lisinopril-hydrochlorothiazide (ZESTORETIC) 10-12.5 MG tablet Take 1 tablet by mouth daily 12/13/21  Yes Esequiel Geller MD  "  Multiple Vitamins-Minerals (ONE-A-DAY MENS 50+ ADVANTAGE) TABS Take 1 tablet by mouth daily   Yes Reported, Patient   naproxen (NAPROSYN) 500 MG tablet Take 1 tablet (500 mg) by mouth 2 times daily as needed for moderate pain With food 10/11/21  Yes Maynor Nieto MD       Physical Exam:  /77   Pulse 78   Temp 98.7  F (37.1  C) (Tympanic)   Resp 18   Ht 1.638 m (5' 4.5\")   Wt 70.3 kg (155 lb)   SpO2 97%   BMI 26.19 kg/m    EXAM:  Chest/Respiratory Exam: Normal - Clear to auscultation without rales, rhonchi, or wheezing.  Cardiovascular Exam: normal, regular rate and rhythm        PLAN: COLONOSCOPY .  Patient understands risks of bleeding, perforation, potential inability to reach cecum, aspiration and wishes to proceed. MAC needed for age      "

## 2022-05-23 NOTE — ANESTHESIA CARE TRANSFER NOTE
Patient: Mayo Madera    Procedure: Procedure(s):  COLONOSCOPY, WITH POLYPECTOMY       Diagnosis: History of colon polyps [Z86.010]  Diagnosis Additional Information: No value filed.    Anesthesia Type:   MAC     Note:    Oropharynx: spontaneously breathing  Level of Consciousness: awake  Oxygen Supplementation: room air    Independent Airway: airway patency satisfactory and stable  Dentition: dentition unchanged  Vital Signs Stable: post-procedure vital signs reviewed and stable  Report to RN Given: handoff report given  Patient transferred to: Phase II    Handoff Report: Identifed the Patient, Identified the Reponsible Provider, Reviewed the pertinent medical history, Discussed the surgical course, Reviewed Intra-OP anesthesia mangement and issues during anesthesia, Set expectations for post-procedure period and Allowed opportunity for questions and acknowledgement of understanding      Vitals:  Vitals Value Taken Time   BP     Temp     Pulse     Resp     SpO2         Electronically Signed By: KELVIN Ling CRNA  May 23, 2022  1:35 PM

## 2022-05-23 NOTE — OP NOTE
PROCEDURE NOTE    DATE OF SERVICE: 5/23/2022    SURGEON: Todd Rebollar MD    PRE-OP DIAGNOSIS:      History of Polyps        POST-OP DIAGNOSIS:  Same  Sigmoid Diverticulosis  Polyp at 20 cm    PROCEDURE:     Colonoscopy with hot biopsy      ANESTHESIA:  VALERIE Martin CRNA    INDICATION FOR THE PROCEDURE: The patient is a 76 year old male with h/o polyps . The patient has no other complaints  . After explaining the risks to include bleeding, perforation, potential inability toreach the cecum, the patient wished to proceed.    PROCEDURE:After adequate sedation, the patient was in the left lateral decubitus position.  Rectal exam was performed.  There was normal tone and no palpable masses .  The colonoscope was introduced into the rectum and advanced to the cecum with Mild difficulty.  The patient's prep was good.  The terminal cecum was reached.  The cecum, ascending, transverse, descending and sigmoid colon was with a scar in cecum, but no recurrence. Sigmoid diverticulosis present. Diminutive polyp at 20 cm hot biopsied and destroyed .  The scope was retroflexed in the rectum.  The rectum was unremarkable  .  The scope was straightened and removed.  The patient tolerated the procedure well.     ESTIMATED BLOOD LOSS: none    COMPLICATIONS:  None    TISSUE REMOVED:  Yes    RECOMMEND:      Follow-up pending pathology  Fiber  Given literature on diverticulosis    Todd Rebollar MD FACS

## 2022-05-23 NOTE — ANESTHESIA POSTPROCEDURE EVALUATION
Patient: Mayo Madera    Procedure: Procedure(s):  COLONOSCOPY, WITH POLYPECTOMY       Anesthesia Type:  MAC    Note:  Disposition: Outpatient   Postop Pain Control: Uneventful            Sign Out: Well controlled pain   PONV: No   Neuro/Psych: Uneventful            Sign Out: Acceptable/Baseline neuro status   Airway/Respiratory: Uneventful            Sign Out: Acceptable/Baseline resp. status   CV/Hemodynamics: Uneventful            Sign Out: Acceptable CV status; No obvious hypovolemia; No obvious fluid overload   Other NRE: NONE   DID A NON-ROUTINE EVENT OCCUR? No           Last vitals:  Vitals Value Taken Time   BP     Temp     Pulse     Resp     SpO2         Electronically Signed By: SARWAT FIORE, KELVIN CRNA  May 23, 2022  2:05 PM

## 2022-05-23 NOTE — DISCHARGE INSTRUCTIONS
[facility]  Adult Discharge Orders & Instructions    ________________________________________________________________          For 12 hours after surgery  Get plenty of rest.  A responsible adult must stay with you for at least 12 hours after you leave the hospital.   You may feel lightheaded.  IF so, sit for a few minutes before standing.  Have someone help you get up.   You may have a slight fever. Call the doctor if your fever is over 101 F (38.3 C) (taken under the tongue) or lasts longer than 24 hours.  You may have a dry mouth, a sore throat, muscle aches or trouble sleeping.  These should go away after 24 hours.  Do not make important or legal decisions.  6.   Do not drive or use heavy equipment.  If you have weakness or tingling, don't drive or use heavy equipment until this feeling goes away.    To contact a doctor, call   [phone]_______________________

## 2022-05-23 NOTE — ANESTHESIA PREPROCEDURE EVALUATION
Anesthesia Pre-Procedure Evaluation    Patient: Mayo Madera   MRN: [id number] : 1945        Procedure : Procedure(s):  COLONOSCOPY          Past Medical History:   Diagnosis Date     Acute poliomyelitis     No Comments Provided     Anxiety disorder          Basal cell carcinoma of skin     2015     Benign essential hypertension      Decreased libido     No Comments Provided     Diverticulosis of large intestine without perforation or abscess without bleeding     3/23/2015     Enlarged prostate without lower urinary tract symptoms (luts)     ,Mild prostatism     Major depressive disorder, single episode          Mixed hyperlipidemia      Tubular adenoma of colon      Uncomplicated alcohol dependence (H)     Quit       Past Surgical History:   Procedure Laterality Date     ANKLE SURGERY Right     For polio, age 14     ARTHROSCOPY SHOULDER Bilateral      COLONOSCOPY      2003,normal     COLONOSCOPY      3/23/15,F/U 12 months     COLONOSCOPY  2016,F/U      COLONOSCOPY  2019    F/U  advanced tubular adenoma cecum     OPEN REDUCTION INTERNAL FIXATION WRIST Left       Allergies   Allergen Reactions     Banana Unknown     Bee Venom Unknown     Kiwi Unknown     Nuts Unknown     Prunus Persica Unknown     Can't eat fresh peach      Social History     Tobacco Use     Smoking status: Former Smoker     Packs/day: 0.50     Years: 10.00     Pack years: 5.00     Types: Cigarettes     Quit date: 2006     Years since quitting: 15.9     Smokeless tobacco: Never Used   Substance Use Topics     Alcohol use: No     Comment: None since       Wt Readings from Last 1 Encounters:   22 70.3 kg (155 lb)        Anesthesia Evaluation   Pt has had prior anesthetic.     No history of anesthetic complications       ROS/MED HX  ENT/Pulmonary:  - neg pulmonary ROS     Neurologic:  - neg neurologic ROS     Cardiovascular:     (+) Dyslipidemia hypertension-----     METS/Exercise Tolerance: >4 METS    Hematologic:  - neg hematologic  ROS     Musculoskeletal:  - neg musculoskeletal ROS     GI/Hepatic:  - neg GI/hepatic ROS   (+) bowel prep,     Renal/Genitourinary:  - neg Renal ROS     Endo:  - neg endo ROS     Psychiatric/Substance Use:     (+) psychiatric history anxiety and depression alcohol abuse     Infectious Disease:  - neg infectious disease ROS     Malignancy:  - neg malignancy ROS     Other:  - neg other ROS          Physical Exam    Airway        Mallampati: II   TM distance: > 3 FB   Neck ROM: full   Mouth opening: > 3 cm    Respiratory Devices and Support         Dental  no notable dental history         Cardiovascular   cardiovascular exam normal       Rhythm and rate: regular and normal     Pulmonary   pulmonary exam normal        breath sounds clear to auscultation           OUTSIDE LABS:  CBC:   Lab Results   Component Value Date    HGB 14.7 10/04/2017    HGB 14.8 03/28/2016    HCT 42.4 10/04/2017     10/04/2017     BMP:   Lab Results   Component Value Date     12/11/2020     11/19/2019    POTASSIUM 3.8 12/11/2020    POTASSIUM 3.6 11/19/2019    CHLORIDE 103 12/11/2020    CHLORIDE 102 11/19/2019    CO2 26 12/11/2020    CO2 28 11/19/2019    BUN 16 12/11/2020    BUN 22 11/19/2019    CR 0.90 12/11/2020    CR 0.91 11/19/2019     (H) 12/11/2020     (H) 11/19/2019     COAGS: No results found for: PTT, INR, FIBR  POC: No results found for: BGM, HCG, HCGS  HEPATIC:   Lab Results   Component Value Date    ALBUMIN 4.3 12/11/2020    PROTTOTAL 6.7 12/11/2020    ALT 16 12/11/2020    AST 18 12/11/2020    ALKPHOS 53 12/11/2020    BILITOTAL 0.6 12/11/2020     OTHER:   Lab Results   Component Value Date    A1C 5.5 12/11/2020    ROBIN 9.6 12/11/2020    MAG 1.9 10/04/2017    T4 0.83 10/04/2017       Anesthesia Plan    ASA Status:  2   NPO Status:  NPO Appropriate    Anesthesia Type: MAC.   Induction: Propofol.           Consents    Anesthesia  Plan(s) and associated risks, benefits, and realistic alternatives discussed. Questions answered and patient/representative(s) expressed understanding.     - Discussed: Risks, Benefits and Alternatives for BOTH SEDATION and the PROCEDURE were discussed     - Discussed with:  Patient      - Extended Intubation/Ventilatory Support Discussed: No.      - Patient is DNR/DNI Status: No    Use of blood products discussed: Yes.     - Discussed with: Patient.     - Consented: consented to blood products            Reason for refusal: other.     Postoperative Care            Comments:                KELVIN SOTO CRNA

## 2022-05-26 LAB
PATH REPORT.COMMENTS IMP SPEC: NORMAL
PATH REPORT.FINAL DX SPEC: NORMAL
PATH REPORT.RELEVANT HX SPEC: NORMAL
PHOTO IMAGE: NORMAL

## 2022-06-01 PROBLEM — K63.5 COLON POLYP: Status: RESOLVED | Noted: 2019-04-22 | Resolved: 2022-06-01

## 2022-06-27 ENCOUNTER — TELEPHONE (OUTPATIENT)
Dept: INTERNAL MEDICINE | Facility: OTHER | Age: 77
End: 2022-06-27

## 2022-06-27 DIAGNOSIS — M19.91 PRIMARY OSTEOARTHRITIS, UNSPECIFIED SITE: Primary | ICD-10-CM

## 2022-06-27 RX ORDER — CELECOXIB 200 MG/1
200 CAPSULE ORAL DAILY
Qty: 30 CAPSULE | Refills: 0 | Status: SHIPPED | OUTPATIENT
Start: 2022-06-27 | End: 2022-07-26

## 2022-06-27 NOTE — TELEPHONE ENCOUNTER
The patient was calling and would like to get a prescription for Celebrex 200 mg as the pharmacist recommended this.   Please advise.

## 2022-06-27 NOTE — TELEPHONE ENCOUNTER
Called patient and informed him of his available prescription to  and to inform him to make an appointment for his Medicare Wellness visit per Dr. Geller.    Annalisa Crum LPN on 6/27/2022 at 2:39 PM

## 2022-07-25 DIAGNOSIS — M19.91 PRIMARY OSTEOARTHRITIS, UNSPECIFIED SITE: ICD-10-CM

## 2022-07-26 RX ORDER — CELECOXIB 200 MG/1
CAPSULE ORAL
Qty: 30 CAPSULE | Refills: 0 | Status: SHIPPED | OUTPATIENT
Start: 2022-07-26 | End: 2022-09-01

## 2022-07-26 NOTE — TELEPHONE ENCOUNTER
[facility] sent Rx request for the following:      Requested Prescriptions   Pending Prescriptions Disp Refills     celecoxib (CELEBREX) 200 MG capsule [Pharmacy Med Name: CELECOXIB 200MG CAPSULES] 30 capsule 0     Sig: TAKE 1 CAPSULE(200 MG) BY MOUTH DAILY       NSAID Medications Failed - 7/26/2022 11:03 AM        Failed - Normal ALT on file in past 12 months     Recent Labs   Lab Test 12/11/20  0855 11/19/19  1058 10/04/17  0824   ALT 16   < >  --    GICHALT  --   --  11    < > = values in this interval not displayed.           Failed - Normal AST on file in past 12 months     Recent Labs   Lab Test 12/11/20  0855 11/19/19  1058 10/04/17  0824   AST 18   < >  --    GICHAST  --   --  16    < > = values in this interval not displayed.           Failed - Recent (12 mo) or future (30 days) visit within the authorizing provider's specialty        Failed - Patient is age 6-64 years        Failed - Normal CBC on file in past 12 months     Recent Labs   Lab Test 10/04/17  0747   GICHWBC 8.5   GICHRBC 4.83   HGB 14.7   HCT 42.4              Failed - Normal serum creatinine on file in past 12 months     Recent Labs   Lab Test 12/11/20  0855   CR 0.90        Last Prescription Date:   6/27/22  Last Fill Qty/Refills:         30, R-0    Last Office Visit:              6/27/21   Future Office visit:           None    Pt due for annual exam. Routing to provider for refill consideration. Routing to  OUTREACH APPT REQUESTS pool, to assist Pt in scheduling appointment.     Vani Qureshi RN .............. 7/26/2022  11:04 AM

## 2022-08-01 NOTE — TELEPHONE ENCOUNTER
LMTCB to schedule annual visit prior to further refills.    Delilah Messina on 8/1/2022 at 3:38 PM

## 2022-08-30 DIAGNOSIS — M19.91 PRIMARY OSTEOARTHRITIS, UNSPECIFIED SITE: ICD-10-CM

## 2022-09-01 RX ORDER — CELECOXIB 200 MG/1
CAPSULE ORAL
Qty: 30 CAPSULE | Refills: 0 | Status: SHIPPED | OUTPATIENT
Start: 2022-09-01 | End: 2022-11-02

## 2022-09-07 ENCOUNTER — OFFICE VISIT (OUTPATIENT)
Dept: FAMILY MEDICINE | Facility: OTHER | Age: 77
End: 2022-09-07
Attending: FAMILY MEDICINE
Payer: COMMERCIAL

## 2022-09-07 VITALS
DIASTOLIC BLOOD PRESSURE: 74 MMHG | HEART RATE: 84 BPM | HEIGHT: 63 IN | RESPIRATION RATE: 18 BRPM | TEMPERATURE: 97.6 F | BODY MASS INDEX: 26.47 KG/M2 | WEIGHT: 149.4 LBS | SYSTOLIC BLOOD PRESSURE: 132 MMHG | OXYGEN SATURATION: 96 %

## 2022-09-07 DIAGNOSIS — G14 POST-POLIO SYNDROME (H): ICD-10-CM

## 2022-09-07 DIAGNOSIS — M62.89 MUSCULAR IMBALANCE: ICD-10-CM

## 2022-09-07 DIAGNOSIS — M17.12 ARTHRITIS OF LEFT KNEE: Primary | ICD-10-CM

## 2022-09-07 PROCEDURE — G0463 HOSPITAL OUTPT CLINIC VISIT: HCPCS

## 2022-09-07 PROCEDURE — 99213 OFFICE O/P EST LOW 20 MIN: CPT | Mod: 25 | Performed by: FAMILY MEDICINE

## 2022-09-07 PROCEDURE — 250N000009 HC RX 250: Performed by: FAMILY MEDICINE

## 2022-09-07 PROCEDURE — 250N000011 HC RX IP 250 OP 636: Performed by: FAMILY MEDICINE

## 2022-09-07 PROCEDURE — 20610 DRAIN/INJ JOINT/BURSA W/O US: CPT | Performed by: FAMILY MEDICINE

## 2022-09-07 PROCEDURE — G0463 HOSPITAL OUTPT CLINIC VISIT: HCPCS | Mod: 25

## 2022-09-07 RX ORDER — TRIAMCINOLONE ACETONIDE 40 MG/ML
40 INJECTION, SUSPENSION INTRA-ARTICULAR; INTRAMUSCULAR ONCE
Status: COMPLETED | OUTPATIENT
Start: 2022-09-07 | End: 2022-09-07

## 2022-09-07 RX ORDER — BUPIVACAINE HYDROCHLORIDE 5 MG/ML
2 INJECTION, SOLUTION EPIDURAL; INTRACAUDAL ONCE
Status: COMPLETED | OUTPATIENT
Start: 2022-09-07 | End: 2022-09-07

## 2022-09-07 RX ORDER — LIDOCAINE HYDROCHLORIDE 10 MG/ML
2 INJECTION, SOLUTION EPIDURAL; INFILTRATION; INTRACAUDAL; PERINEURAL ONCE
Status: COMPLETED | OUTPATIENT
Start: 2022-09-07 | End: 2022-09-07

## 2022-09-07 RX ADMIN — LIDOCAINE HYDROCHLORIDE 2 ML: 10 INJECTION, SOLUTION INFILTRATION; PERINEURAL at 09:28

## 2022-09-07 RX ADMIN — TRIAMCINOLONE ACETONIDE 40 MG: 40 INJECTION, SUSPENSION INTRA-ARTICULAR; INTRAMUSCULAR at 09:28

## 2022-09-07 RX ADMIN — BUPIVACAINE HYDROCHLORIDE 10 MG: 5 INJECTION, SOLUTION EPIDURAL; INTRACAUDAL; PERINEURAL at 09:27

## 2022-09-07 ASSESSMENT — PAIN SCALES - GENERAL: PAINLEVEL: SEVERE PAIN (6)

## 2022-09-07 NOTE — NURSING NOTE
"Chief Complaint   Patient presents with     Follow Up     Left knee arthritis     Patient presents for follow up left knee arthritis. Pain 6/10. Last injections 4/22/22 with Dr. Srivastava. Lasted up until 3 weeks ago.     Initial /74 (BP Location: Right arm, Patient Position: Sitting, Cuff Size: Adult Regular)   Pulse 84   Temp 97.6  F (36.4  C) (Tympanic)   Resp 18   Ht 1.594 m (5' 2.75\")   Wt 67.8 kg (149 lb 6.4 oz)   SpO2 96%   BMI 26.68 kg/m   Estimated body mass index is 26.68 kg/m  as calculated from the following:    Height as of this encounter: 1.594 m (5' 2.75\").    Weight as of this encounter: 67.8 kg (149 lb 6.4 oz).       Medication Reconciliation: Raj Delacruz LPN .......  9/7/2022  8:49 AM   "

## 2022-09-07 NOTE — PROGRESS NOTES
"Sports Medicine Office Note    HPI:  77-year-old male with a history of polio at age 4 coming in for evaluation of left knee pain.  He was previously seen in this office on 10/11/2021.  At that time he was prescribed naproxen and physical therapy.  His knee pain persisted though he did not follow-up in this office.  He was seen in [place], [place] in April of this year.  He received a CSI at that time.  He received approximately 4 months of pain relief.  His pain has returned in the last several weeks.  He rates his pain 6/10.  He characterizes the pain as achy.  He is still using Celebrex which provides mild-moderate symptom relief.  No new injuries.      EXAM:  /74 (BP Location: Right arm, Patient Position: Sitting, Cuff Size: Adult Regular)   Pulse 84   Temp 97.6  F (36.4  C) (Tympanic)   Resp 18   Ht 1.594 m (5' 2.75\")   Wt 67.8 kg (149 lb 6.4 oz)   SpO2 96%   BMI 26.68 kg/m    MUSCULOSKELETAL EXAM:  LEFT KNEE  Inspection:  -No gross deformity  -No bruising or soft tissue swelling  -Scars:  None    Tenderness to palpation of the:  -Quadriceps musculature:  Negative  -Quadriceps tendon:  Negative  -Patella:  Negative  -Medial patellar facet:  Negative  -Lateral patellar facet:  Negative  -Inferior pole of the patella:  Negative  -Patellar tendon:  Negative  -Tibial tuberosity:  Negative  -Medial joint line:  Negative  -Medial collateral ligament:  Negative  -Medial hamstring tendons:  Negative  -Medial femoral condyle:  Negative  -Medial tibial plateau:  Negative  -Pes anserine bursa:  Negative  -Lateral joint line:  Negative  -Distal IT band:  Negative  -Gerdy's tubercle:  Negative  -Lateral collateral ligament:  Negative  -Lateral hamstring tendons:  Negative  -Lateral femoral condyle:  Negative  -Lateral tibial plateau:  Negative  -Posterior lateral corner:  Negative  -Popliteal fossa:  Negative    Range of Motion:  -Passive extension:  0    Special Tests:  -Effusion:  Absent  -Medial patellar glide:  " Negative  -Lateral patellar glide:  Negative  -Patellar apprehension:  Negative  -Valgus stress:  Negative  -Varus stress:  Negative    Other:  -Intact sensation to light touch distally.  -No signs of cyanosis. Normal skin temperature of the lower extremity.  -Foot/ankle:  No gross deformity. Full range of motion.  -Right knee:  No gross deformity. No palpable tenderness. Normal strength and ROM.      IMAGING:  10/11/2021: 4 view left knee x-ray  -No fracture, dislocation, or bony lesion.  Moderate degenerative changes with associated osteophytosis in the medial tibiofemoral compartment.  Left knee more elevated than right.  Significant size discrepancy of the musculature about the right and left lower extremities.      ASSESSMENT/PLAN:  Diagnoses and all orders for this visit:  Arthritis of left knee  -     triamcinolone (KENALOG-40) injection 40 mg  -     lidocaine (PF) (XYLOCAINE) 1 % injection 2 mL  -     Bupivacaine 0.5% 2mL Intra-articular  -     DRAIN/INJECT LARGE JOINT/BURSA  Post-polio syndrome  Muscular imbalance    77-year-old male with left knee arthritis.  X-rays from October 2021 were again personally reviewed with the findings as demonstrated above by my interpretation.  Treatment options were reviewed which include aerobic activity, PT, OTC analgesics, prescription NSAIDs, CSI, and surgical referral.  After reviewing the risks/benefits, the patient elects to proceed with an intra-articular corticosteroid injection.  See procedure note below:    PROCEDURE:  Intraarticular Injection of the Left Knee     PROCEDURAL PAUSE:    A procedural pause was conducted to verify:  correct patient identity, procedure to be performed, and as applicable, correct side, site, and correct patient position.      INFORMED CONSENT:   I discussed the risks, possible benefits, and alternatives to injection.  Following denial of allergy and review of potential side effects and complications (including but not necessarily  limited to infection, allergic reaction, fat necrosis, skin depigmentation, local tissue breakdown, systemic effects of corticosteroids, elevation of blood glucose, injury to soft tissue and/or nerves, and seizure), all questions were answered, and consent was given to proceed.  Patient verbalized understanding.      PROCEDURE DETAILS:    Side and site were marked, and a time out was performed to verify appropriate patient identifiers.  Following this the left knee, just superior to the tibial plateau and medial to the patellar tendon, was prepped with chlorhexidine.  Utilizing a 22-gauge needle the left intraarticular knee was injected using a anteromedial to posterolateral approach with 2mL of 1% Lidocaine, 2mL of 0.5% bupivacaine, and 1mL triamcinolone (40mg/mL).  0mL was wasted.      The patient tolerated the procedure without complication and was discharged in good condition after a short observation period.  The patient was instructed to contact me regarding any questions pertaining to the procedure.      DIAGNOSIS:    -Successful injection of the left intraarticular knee without immediate complication      PLAN:   -Post-procedure care reviewed, including avoiding submersion of the injection site for 48 hours   -Return precautions reviewed for signs/symptoms that would be concerning for infection   -The patient was instructed to ice and take APAP this evening if needed   -Continue Celebrex as previously prescribed  - Consider PT in the future to improve functionality of the joint  -Return to clinic as needed      Maynor Valadez MD  9/7/2022  7:47 AM    Total time spent with this patient was 26 minutes which included chart review, visualization and independent interpretation of images, time spent with the patient, and documentation.    Procedure time:  3 minute(s)

## 2022-10-31 DIAGNOSIS — M19.91 PRIMARY OSTEOARTHRITIS, UNSPECIFIED SITE: ICD-10-CM

## 2022-11-02 ENCOUNTER — OFFICE VISIT (OUTPATIENT)
Dept: INTERNAL MEDICINE | Facility: OTHER | Age: 77
End: 2022-11-02
Attending: INTERNAL MEDICINE
Payer: COMMERCIAL

## 2022-11-02 VITALS
DIASTOLIC BLOOD PRESSURE: 68 MMHG | OXYGEN SATURATION: 97 % | WEIGHT: 151 LBS | TEMPERATURE: 97.2 F | RESPIRATION RATE: 16 BRPM | SYSTOLIC BLOOD PRESSURE: 132 MMHG | BODY MASS INDEX: 26.75 KG/M2 | HEIGHT: 63 IN | HEART RATE: 89 BPM

## 2022-11-02 DIAGNOSIS — R73.9 HYPERGLYCEMIA: ICD-10-CM

## 2022-11-02 DIAGNOSIS — Z86.0101 H/O ADENOMATOUS POLYP OF COLON: ICD-10-CM

## 2022-11-02 DIAGNOSIS — I10 ESSENTIAL HYPERTENSION: ICD-10-CM

## 2022-11-02 DIAGNOSIS — E78.49 OTHER HYPERLIPIDEMIA: Primary | ICD-10-CM

## 2022-11-02 DIAGNOSIS — F41.9 ANXIETY DISORDER, UNSPECIFIED TYPE: ICD-10-CM

## 2022-11-02 LAB
ALBUMIN SERPL-MCNC: 4.3 G/DL (ref 3.5–5.7)
ALP SERPL-CCNC: 57 U/L (ref 34–104)
ALT SERPL W P-5'-P-CCNC: 15 U/L (ref 7–52)
ANION GAP SERPL CALCULATED.3IONS-SCNC: 7 MMOL/L (ref 3–14)
AST SERPL W P-5'-P-CCNC: 18 U/L (ref 13–39)
BILIRUB SERPL-MCNC: 0.8 MG/DL (ref 0.3–1)
BUN SERPL-MCNC: 22 MG/DL (ref 7–25)
CALCIUM SERPL-MCNC: 9.6 MG/DL (ref 8.6–10.3)
CHLORIDE BLD-SCNC: 104 MMOL/L (ref 98–107)
CHOLEST SERPL-MCNC: 203 MG/DL
CO2 SERPL-SCNC: 27 MMOL/L (ref 21–31)
CREAT SERPL-MCNC: 0.91 MG/DL (ref 0.7–1.3)
FASTING STATUS PATIENT QL REPORTED: YES
GFR SERPL CREATININE-BSD FRML MDRD: 88 ML/MIN/1.73M2
GLUCOSE BLD-MCNC: 112 MG/DL (ref 70–105)
HBA1C MFR BLD: 5.6 % (ref 4–6.2)
HDLC SERPL-MCNC: 52 MG/DL (ref 23–92)
LDLC SERPL CALC-MCNC: 135 MG/DL
NONHDLC SERPL-MCNC: 151 MG/DL
POTASSIUM BLD-SCNC: 3.9 MMOL/L (ref 3.5–5.1)
PROT SERPL-MCNC: 6.8 G/DL (ref 6.4–8.9)
SODIUM SERPL-SCNC: 138 MMOL/L (ref 134–144)
TRIGL SERPL-MCNC: 78 MG/DL

## 2022-11-02 PROCEDURE — 82040 ASSAY OF SERUM ALBUMIN: CPT | Mod: ZL | Performed by: INTERNAL MEDICINE

## 2022-11-02 PROCEDURE — G0008 ADMIN INFLUENZA VIRUS VAC: HCPCS

## 2022-11-02 PROCEDURE — 83036 HEMOGLOBIN GLYCOSYLATED A1C: CPT | Mod: ZL | Performed by: INTERNAL MEDICINE

## 2022-11-02 PROCEDURE — 91312 COVID-19,PF,PFIZER BOOSTER BIVALENT: CPT

## 2022-11-02 PROCEDURE — G0439 PPPS, SUBSEQ VISIT: HCPCS | Performed by: INTERNAL MEDICINE

## 2022-11-02 PROCEDURE — 36415 COLL VENOUS BLD VENIPUNCTURE: CPT | Mod: ZL | Performed by: INTERNAL MEDICINE

## 2022-11-02 PROCEDURE — 80061 LIPID PANEL: CPT | Mod: ZL | Performed by: INTERNAL MEDICINE

## 2022-11-02 PROCEDURE — 0124A COVID-19,PF,PFIZER BOOSTER BIVALENT: CPT

## 2022-11-02 PROCEDURE — 80053 COMPREHEN METABOLIC PANEL: CPT | Mod: ZL | Performed by: INTERNAL MEDICINE

## 2022-11-02 ASSESSMENT — ENCOUNTER SYMPTOMS
EYE PAIN: 0
DYSURIA: 0
HEARTBURN: 0
WEAKNESS: 0
ABDOMINAL PAIN: 0
SORE THROAT: 0
DIZZINESS: 0
FREQUENCY: 0
NERVOUS/ANXIOUS: 1
COUGH: 0
SHORTNESS OF BREATH: 0
DIARRHEA: 0
PARESTHESIAS: 0
CHILLS: 0
CONSTIPATION: 0
HEADACHES: 0
HEMATURIA: 0
FEVER: 0
HEMATOCHEZIA: 0
NAUSEA: 0
PALPITATIONS: 0
ARTHRALGIAS: 1

## 2022-11-02 ASSESSMENT — ACTIVITIES OF DAILY LIVING (ADL): CURRENT_FUNCTION: NO ASSISTANCE NEEDED

## 2022-11-02 ASSESSMENT — PAIN SCALES - GENERAL: PAINLEVEL: NO PAIN (0)

## 2022-11-02 NOTE — NURSING NOTE
"Chief Complaint   Patient presents with     Medicare Visit     Px       Initial /68   Pulse 89   Temp 97.2  F (36.2  C) (Temporal)   Resp 16   Ht 1.6 m (5' 2.99\")   Wt 68.5 kg (151 lb)   SpO2 97%   BMI 26.76 kg/m   Estimated body mass index is 26.76 kg/m  as calculated from the following:    Height as of this encounter: 1.6 m (5' 2.99\").    Weight as of this encounter: 68.5 kg (151 lb).  FOOD SECURITY SCREENING QUESTIONS  Hunger Vital Signs:  Within the past 12 months we worried whether our food would run out before we got money to buy more. Never  Within the past 12 months the food we bought just didn't last and we didn't have money to get more. Never        Carlos Rodríguez, LPN    "

## 2022-11-02 NOTE — PROGRESS NOTES
"SUBJECTIVE:   Mayo is a 77 year old who presents for Preventive Visit.    This patient is here today for Medicare wellness visit.  He tells me that in most respects he feels fine.  He has hyperlipidemia and is currently not treated.  He does not have any known vascular disease.  He also has treated hypertension and is on single drug therapy that he controls his blood pressure with.  No endorgan disease.  He has mild hyperglycemia and is due for recheck on that.  He has a history of anxiety and is stable with BuSpar.    Other than that he feels really quite well.  He does have osteoarthritis and is getting CSI to his knee.  This has been very helpful.  He plans to go to [place] after the first of the year and will be there for 3 months.  He will probably want another injection before he leaves.    Medications are reconciled.  Past medical history, past surgical history, family history and social histories are reviewed and updated.  Immunizations are reviewed, we will give him a flu shot and COVID booster today.  I encouraged him to look into getting shingles vaccine.  Colonoscopy was done earlier this year.    Patient has been advised of split billing requirements and indicates understanding: Yes  Are you in the first 12 months of your Medicare coverage?  No    Healthy Habits:     In general, how would you rate your overall health?  Excellent    Frequency of exercise:  2-3 days/week    Duration of exercise:  15-30 minutes    Do you usually eat at least 4 servings of fruit and vegetables a day, include whole grains    & fiber and avoid regularly eating high fat or \"junk\" foods?  Yes    Taking medications regularly:  Yes    Medication side effects:  None    Ability to successfully perform activities of daily living:  No assistance needed    Home Safety:  No safety concerns identified    Hearing Impairment:  Need to ask people to speak up or repeat themselves    In the past 6 months, have you been bothered by leaking " of urine?  No    In general, how would you rate your overall mental or emotional health?  Excellent      PHQ-2 Total Score: 0    Additional concerns today:  No    Do you feel safe in your environment? Yes    Have you ever done Advance Care Planning? (For example, a Health Directive, POLST, or a discussion with a medical provider or your loved ones about your wishes): No, advance care planning information given to patient to review.  Patient declined advance care planning discussion at this time.       Fall risk  Fallen 2 or more times in the past year?: No  Any fall with injury in the past year?: No    Cognitive Screening   1) Repeat 3 items (Leader, Season, Table)    2) Clock draw: NORMAL  3) 3 item recall: Recalls 2 objects   Results: NORMAL clock, 1-2 items recalled: COGNITIVE IMPAIRMENT LESS LIKELY    Mini-CogTM Copyright S Janeth. Licensed by the author for use in [facility]; reprinted with permission (bairon@[facility]). All rights reserved.      Do you have sleep apnea, excessive snoring or daytime drowsiness?: no    Reviewed and updated as needed this visit by clinical staff   Tobacco  Allergies  Meds  Problems  Med Hx  Surg Hx  Fam Hx          Reviewed and updated as needed this visit by Provider   Tobacco  Allergies  Meds  Problems  Med Hx  Surg Hx  Fam Hx         Social History     Tobacco Use     Smoking status: Former     Packs/day: 0.50     Years: 10.00     Pack years: 5.00     Types: Cigarettes     Quit date: 2006     Years since quittin.4     Smokeless tobacco: Never   Substance Use Topics     Alcohol use: No     Comment: None since      If you drink alcohol do you typically have >3 drinks per day or >7 drinks per week? No    Alcohol Use 2022   Prescreen: >3 drinks/day or >7 drinks/week? No   Prescreen: >3 drinks/day or >7 drinks/week? -           Current Outpatient Medications   Medication     buPROPion (WELLBUTRIN XL) 300 MG 24 hr tablet     ibuprofen  "(ADVIL/MOTRIN) 200 MG tablet     lisinopril-hydrochlorothiazide (ZESTORETIC) 10-12.5 MG tablet     Multiple Vitamins-Minerals (ONE-A-DAY MENS 50+ ADVANTAGE) TABS     No current facility-administered medications for this visit.         Current providers sharing in care for this patient include:   Patient Care Team:  Esequiel Geller MD as PCP - General (Internal Medicine)  Maynor Nieto MD as Assigned PCP    The following health maintenance items are reviewed in Epic and correct as of today:  Health Maintenance   Topic Date Due     DTAP/TDAP/TD IMMUNIZATION (2 - Td or Tdap) 07/13/2020     ZOSTER IMMUNIZATION (3 of 3) 02/05/2021     COVID-19 Vaccine (5 - Booster for Moderna series) 08/01/2022     INFLUENZA VACCINE (1) 09/01/2022     MEDICARE ANNUAL WELLNESS VISIT  11/02/2023     FALL RISK ASSESSMENT  11/02/2023     COLORECTAL CANCER SCREENING  05/23/2025     LIPID  12/11/2025     ADVANCE CARE PLANNING  11/02/2027     HEPATITIS C SCREENING  Completed     PHQ-2 (once per calendar year)  Completed     Pneumococcal Vaccine: 65+ Years  Completed     HEPATITIS B IMMUNIZATION  Completed     AORTIC ANEURYSM SCREENING (SYSTEM ASSIGNED)  Completed     IPV IMMUNIZATION  Aged Out     MENINGITIS IMMUNIZATION  Aged Out     Lab work is in process          Review of Systems  Constitutional, HEENT, cardiovascular, pulmonary, GI, , musculoskeletal, neuro, skin, endocrine and psych systems are negative, except as otherwise noted.    OBJECTIVE:   /68   Pulse 89   Temp 97.2  F (36.2  C) (Temporal)   Resp 16   Ht 1.6 m (5' 2.99\")   Wt 68.5 kg (151 lb)   SpO2 97%   BMI 26.76 kg/m   Estimated body mass index is 26.76 kg/m  as calculated from the following:    Height as of this encounter: 1.6 m (5' 2.99\").    Weight as of this encounter: 68.5 kg (151 lb).  Physical Exam  GENERAL: healthy, alert and no distress  EYES: Eyes grossly normal to inspection, PERRL and conjunctivae and sclerae normal  HENT: ear canals and TM's normal, " "nose and mouth without ulcers or lesions  NECK: no adenopathy, no asymmetry, masses, or scars and thyroid normal to palpation  RESP: lungs clear to auscultation - no rales, rhonchi or wheezes  CV: regular rate and rhythm, normal S1 S2, no S3 or S4, no murmur, click or rub, no peripheral edema and peripheral pulses strong  ABDOMEN: soft, nontender, no hepatosplenomegaly, no masses and bowel sounds normal  : Normal male, no hernia.  Prostate 2+ and otherwise normal  MS: Atrophy right lower extremity compared to left from previous polio  SKIN: no suspicious lesions or rashes  NEURO: Normal strength and tone, mentation intact and speech normal  PSYCH: mentation appears normal, affect normal/bright        ASSESSMENT / PLAN:       ICD-10-CM    1. Other hyperlipidemia  E78.49 Comprehensive metabolic panel     Lipid Panel      2. Essential hypertension  I10       3. Anxiety disorder, unspecified type  F41.9       4. H/O adenomatous polyp of colon  Z86.010       5. Hyperglycemia  R73.9 Hemoglobin A1c          Patient has been advised of split billing requirements and indicates understanding: Yes      COUNSELING:     He appears to be healthy.  His exam is normal.  Medications will continue without change.  Refills were done as needed.  Lab pending, I will send him a letter with the results.  Flu shot and COVID booster given today.  Encouraged him to get a Shingrix vaccine.  Assuming all goes well, follow-up annually.  Reviewed preventive health counseling, as reflected in patient instructions       Regular exercise       Healthy diet/nutrition    Estimated body mass index is 26.76 kg/m  as calculated from the following:    Height as of this encounter: 1.6 m (5' 2.99\").    Weight as of this encounter: 68.5 kg (151 lb).        He reports that he quit smoking about 16 years ago. His smoking use included cigarettes. He has a 5.00 pack-year smoking history. He has never used smokeless tobacco.      Appropriate preventive " services were discussed with this patient, including applicable screening as appropriate for cardiovascular disease, diabetes, osteopenia/osteoporosis, and glaucoma.  As appropriate for age/gender, discussed screening for colorectal cancer, prostate cancer, breast cancer, and cervical cancer. Checklist reviewing preventive services available has been given to the patient.    Reviewed patients plan of care and provided an AVS. The Basic Care Plan (routine screening as documented in Health Maintenance) for Mayo meets the Care Plan requirement. This Care Plan has been established and reviewed with the Patient.    Counseling Resources:  ATP IV Guidelines  Pooled Cohorts Equation Calculator  Breast Cancer Risk Calculator  Breast Cancer: Medication to Reduce Risk  FRAX Risk Assessment  ICSI Preventive Guidelines  Dietary Guidelines for Americans, 2010  USDA's MyPlate  ASA Prophylaxis  Lung CA Screening    JT COLEMAN MD  [facility]    Identified Health Risks:

## 2022-11-02 NOTE — LETTER
November 2, 2022      [place]  [address] [address]        Dear Mayo,    Below are the results of your recent labs:    Results for orders placed or performed in visit on 11/02/22   Comprehensive metabolic panel     Status: Abnormal   Result Value Ref Range    Sodium 138 134 - 144 mmol/L    Potassium 3.9 3.5 - 5.1 mmol/L    Chloride 104 98 - 107 mmol/L    Carbon Dioxide (CO2) 27 21 - 31 mmol/L    Anion Gap 7 3 - 14 mmol/L    Urea Nitrogen 22 7 - 25 mg/dL    Creatinine 0.91 0.70 - 1.30 mg/dL    Calcium 9.6 8.6 - 10.3 mg/dL    Glucose 112 (H) 70 - 105 mg/dL    Alkaline Phosphatase 57 34 - 104 U/L    AST 18 13 - 39 U/L    ALT 15 7 - 52 U/L    Protein Total 6.8 6.4 - 8.9 g/dL    Albumin 4.3 3.5 - 5.7 g/dL    Bilirubin Total 0.8 0.3 - 1.0 mg/dL    GFR Estimate 88 >60 mL/min/1.73m2   Lipid Panel     Status: Abnormal   Result Value Ref Range    Cholesterol 203 (H) <200 mg/dL    Triglycerides 78 <150 mg/dL    Direct Measure HDL 52 23 - 92 mg/dL    LDL Cholesterol Calculated 135 (H) <=100 mg/dL    Non HDL Cholesterol 151 (H) <130 mg/dL    Patient Fasting > 8hrs? Yes     Narrative    Cholesterol  Desirable:  <200 mg/dL    Triglycerides  Normal:  Less than 150 mg/dL  Borderline High:  150-199 mg/dL  High:  200-499 mg/dL  Very High:  Greater than or equal to 500 mg/dL    Direct Measure HDL  Female:  Greater than or equal to 50 mg/dL   Male:  Greater than or equal to 40 mg/dL    LDL Cholesterol  Desirable:  <100mg/dL  Above Desirable:  100-129 mg/dL   Borderline High:  130-159 mg/dL   High:  160-189 mg/dL   Very High:  >= 190 mg/dL    Non HDL Cholesterol  Desirable:  130 mg/dL  Above Desirable:  130-159 mg/dL  Borderline High:  160-189 mg/dL  High:  190-219 mg/dL  Very High:  Greater than or equal to 220 mg/dL   Hemoglobin A1c     Status: Normal   Result Value Ref Range    Hemoglobin A1C 5.6 4.0 - 6.2 %        Your cholesterol and your blood sugar are both a little bit on the higher side of normal.   Make sure that you try to follow a healthy diet and get regular exercise to keep this as low as possible.  Everything else looks great.  If you have any questions about your results, feel free to contact me.    Sincerely,        Esequiel Geller MD  Internal Medicine  [facility]

## 2022-11-03 RX ORDER — CELECOXIB 200 MG/1
CAPSULE ORAL
Qty: 30 CAPSULE | Refills: 0 | Status: SHIPPED | OUTPATIENT
Start: 2022-11-03 | End: 2022-12-26

## 2022-12-26 ENCOUNTER — OFFICE VISIT (OUTPATIENT)
Dept: FAMILY MEDICINE | Facility: OTHER | Age: 77
End: 2022-12-26
Attending: FAMILY MEDICINE
Payer: COMMERCIAL

## 2022-12-26 VITALS
TEMPERATURE: 97.3 F | HEART RATE: 96 BPM | BODY MASS INDEX: 26.4 KG/M2 | DIASTOLIC BLOOD PRESSURE: 64 MMHG | RESPIRATION RATE: 18 BRPM | OXYGEN SATURATION: 96 % | SYSTOLIC BLOOD PRESSURE: 132 MMHG | WEIGHT: 149 LBS

## 2022-12-26 DIAGNOSIS — G14 POST-POLIO SYNDROME (H): ICD-10-CM

## 2022-12-26 DIAGNOSIS — M17.12 ARTHRITIS OF LEFT KNEE: Primary | ICD-10-CM

## 2022-12-26 DIAGNOSIS — M62.89 MUSCULAR IMBALANCE: ICD-10-CM

## 2022-12-26 PROCEDURE — 20610 DRAIN/INJ JOINT/BURSA W/O US: CPT | Performed by: FAMILY MEDICINE

## 2022-12-26 PROCEDURE — G0463 HOSPITAL OUTPT CLINIC VISIT: HCPCS

## 2022-12-26 PROCEDURE — G0463 HOSPITAL OUTPT CLINIC VISIT: HCPCS | Mod: 25

## 2022-12-26 PROCEDURE — 250N000011 HC RX IP 250 OP 636: Performed by: FAMILY MEDICINE

## 2022-12-26 PROCEDURE — 99214 OFFICE O/P EST MOD 30 MIN: CPT | Mod: 25 | Performed by: FAMILY MEDICINE

## 2022-12-26 PROCEDURE — 250N000009 HC RX 250: Performed by: FAMILY MEDICINE

## 2022-12-26 RX ORDER — LIDOCAINE HYDROCHLORIDE 10 MG/ML
2 INJECTION, SOLUTION EPIDURAL; INFILTRATION; INTRACAUDAL; PERINEURAL ONCE
Status: COMPLETED | OUTPATIENT
Start: 2022-12-26 | End: 2022-12-26

## 2022-12-26 RX ORDER — BUPIVACAINE HYDROCHLORIDE 5 MG/ML
2 INJECTION, SOLUTION EPIDURAL; INTRACAUDAL ONCE
Status: COMPLETED | OUTPATIENT
Start: 2022-12-26 | End: 2022-12-26

## 2022-12-26 RX ORDER — CELECOXIB 200 MG/1
200 CAPSULE ORAL DAILY
Qty: 90 CAPSULE | Refills: 0 | Status: SHIPPED | OUTPATIENT
Start: 2022-12-26 | End: 2023-06-09

## 2022-12-26 RX ORDER — TRIAMCINOLONE ACETONIDE 40 MG/ML
40 INJECTION, SUSPENSION INTRA-ARTICULAR; INTRAMUSCULAR ONCE
Status: COMPLETED | OUTPATIENT
Start: 2022-12-26 | End: 2022-12-26

## 2022-12-26 RX ADMIN — TRIAMCINOLONE ACETONIDE 40 MG: 40 INJECTION, SUSPENSION INTRA-ARTICULAR; INTRAMUSCULAR at 10:10

## 2022-12-26 RX ADMIN — BUPIVACAINE HYDROCHLORIDE 10 MG: 5 INJECTION, SOLUTION EPIDURAL; INTRACAUDAL; PERINEURAL at 10:10

## 2022-12-26 RX ADMIN — LIDOCAINE HYDROCHLORIDE 2 ML: 10 INJECTION, SOLUTION INFILTRATION; PERINEURAL at 10:10

## 2022-12-26 ASSESSMENT — PAIN SCALES - GENERAL: PAINLEVEL: MODERATE PAIN (5)

## 2022-12-26 NOTE — NURSING NOTE
"Chief Complaint   Patient presents with     Follow Up     Left knee     Patient is here for follow up left knee arthritis. Pain 5/10. Last injection: 9/7/22    Initial /64 (BP Location: Right arm, Patient Position: Sitting, Cuff Size: Adult Regular)   Pulse 96   Temp 97.3  F (36.3  C) (Tympanic)   Resp 18   Wt 67.6 kg (149 lb)   SpO2 96%   BMI 26.40 kg/m   Estimated body mass index is 26.4 kg/m  as calculated from the following:    Height as of 11/2/22: 1.6 m (5' 2.99\").    Weight as of this encounter: 67.6 kg (149 lb).       Medication Reconciliation: Complete    Mariela Delacruz LPN .......  12/26/2022  9:54 AM   "

## 2022-12-26 NOTE — PROGRESS NOTES
Sports Medicine Office Note    HPI:  77-year-old male coming in for evaluation of left knee pain.  He has known arthritis.  He was last seen in this office in September 2022.  At that time he received an injection into his joint.  He notes 2.5-3 months of significant symptom relief.  His pain has returned.  He rates his pain at 5/10.  He characterizes the pain as achy.  He still takes celecoxib to help with his symptoms.  No new injuries.      EXAM:  /64 (BP Location: Right arm, Patient Position: Sitting, Cuff Size: Adult Regular)   Pulse 96   Temp 97.3  F (36.3  C) (Tympanic)   Resp 18   Wt 67.6 kg (149 lb)   SpO2 96%   BMI 26.40 kg/m    Musculoskeletal exam: Left knee  - No gross deformity  - No bruising or swelling  - Normal skin temperature without cyanosis  - Mild tenderness to palpation of the medial and lateral joint lines      IMAGING:  10/11/2021: 4 view left knee x-ray  -No fracture, dislocation, or bony lesion.  Moderate degenerative changes with associated osteophytosis in the medial tibiofemoral compartment.  Left knee more elevated than right.  Significant size discrepancy of the musculature about the right and left lower extremities.      ASSESSMENT/PLAN:  Diagnoses and all orders for this visit:  Arthritis of left knee  -     DRAIN/INJECT LARGE JOINT/BURSA  -     lidocaine (PF) (XYLOCAINE) 1 % injection 2 mL  -     Bupivacaine 0.5% 2mL Intra-articular  -     triamcinolone (KENALOG-40) injection 40 mg  -     celecoxib (CELEBREX) 200 MG capsule; Take 1 capsule (200 mg) by mouth daily Take with food.  Post-polio syndrome  Muscular imbalance    77-year-old male with left knee arthritis.  X-rays from October 2021 were again personally reviewed in the office with the findings as demonstrated above by my interpretation.  Treatment options include aerobic activity, PT, OTC analgesics, prescription NSAIDs, CSI, and surgical referral.  After reviewing the risks/benefits, the patient elects to  proceed with an intra-articular corticosteroid injection.  See procedure note below:    PROCEDURE:  Intraarticular Injection of the Left Knee     PROCEDURAL PAUSE:    A procedural pause was conducted to verify:  correct patient identity, procedure to be performed, and as applicable, correct side, site, and correct patient position.      INFORMED CONSENT:   I discussed the risks, possible benefits, and alternatives to injection.  Following denial of allergy and review of potential side effects and complications (including but not necessarily limited to infection, allergic reaction, fat necrosis, skin depigmentation, local tissue breakdown, systemic effects of corticosteroids, elevation of blood glucose, injury to soft tissue and/or nerves, and seizure), all questions were answered, and consent was given to proceed.  Patient verbalized understanding.      PROCEDURE DETAILS:    Side and site were marked, and a time out was performed to verify appropriate patient identifiers.  Following this the left knee, just superior to the tibial plateau and medial to the patellar tendon, was prepped with chlorhexidine.  Utilizing a 22-gauge needle the left intraarticular knee was injected using a anteromedial to posterolateral approach with 2mL of 1% Lidocaine, 2mL of 0.5% bupivacaine, and 1mL triamcinolone (40mg/mL).  0mL was wasted.      The patient tolerated the procedure without complication and was discharged in good condition after a short observation period.  The patient was instructed to contact me regarding any questions pertaining to the procedure.      DIAGNOSIS:    -Successful injection of the left intraarticular knee without immediate complication      PLAN:   -Post-procedure care reviewed, including avoiding submersion of the injection site for 48 hours   -Return precautions reviewed for signs/symptoms that would be concerning for infection   -The patient was instructed to ice and take APAP this evening if needed    -Continue Celebrex as previously prescribed  -Consider PT in the future to improve functionality of the joint  -Return to clinic as needed      Maynor Valadez MD  12/26/2022  10:06 AM    Total time spent with this patient was 18 minutes which included chart review, visualization and independent interpretation of images, time spent with the patient, and documentation.    Procedure time:  3 minute(s)

## 2022-12-27 DIAGNOSIS — I10 ESSENTIAL HYPERTENSION: ICD-10-CM

## 2022-12-27 DIAGNOSIS — F41.9 ANXIETY DISORDER, UNSPECIFIED TYPE: ICD-10-CM

## 2022-12-28 NOTE — TELEPHONE ENCOUNTER
[facility] sent Rx request for the following:      Requested Prescriptions   Pending Prescriptions Disp Refills     lisinopril-hydrochlorothiazide (ZESTORETIC) 10-12.5 MG tablet [Pharmacy Med Name: LISINOPRIL-HCTZ 10/12.5MG TABLETS] 90 tablet 0     Sig: TAKE 1 TABLET BY MOUTH DAILY   Last Prescription Date:   12/13/21  Last Fill Qty/Refills:         90, R-0         buPROPion (WELLBUTRIN XL) 300 MG 24 hr tablet [Pharmacy Med Name: BUPROPION XL 300MG TABLETS] 90 tablet 0     Sig: TAKE 1 TABLET(300 MG) BY MOUTH DAILY   Last Prescription Date:   12/13/21  Last Fill Qty/Refills:         90, R-0      Last Office Visit:              11/2/22   Future Office visit:           None    Per LOV note:  Assuming all goes well, follow-up annually.    Vani Qureshi RN .............. 12/28/2022  4:53 PM

## 2022-12-29 RX ORDER — LISINOPRIL/HYDROCHLOROTHIAZIDE 10-12.5 MG
1 TABLET ORAL DAILY
Qty: 90 TABLET | Refills: 2 | Status: SHIPPED | OUTPATIENT
Start: 2022-12-29 | End: 2023-10-13

## 2022-12-29 RX ORDER — BUPROPION HYDROCHLORIDE 300 MG/1
TABLET ORAL
Qty: 90 TABLET | Refills: 2 | Status: SHIPPED | OUTPATIENT
Start: 2022-12-29 | End: 2023-10-27

## 2023-04-18 ENCOUNTER — OFFICE VISIT (OUTPATIENT)
Dept: FAMILY MEDICINE | Facility: OTHER | Age: 78
End: 2023-04-18
Attending: FAMILY MEDICINE
Payer: COMMERCIAL

## 2023-04-18 VITALS
SYSTOLIC BLOOD PRESSURE: 136 MMHG | RESPIRATION RATE: 16 BRPM | HEART RATE: 86 BPM | OXYGEN SATURATION: 96 % | DIASTOLIC BLOOD PRESSURE: 74 MMHG | WEIGHT: 152 LBS | TEMPERATURE: 97.1 F | BODY MASS INDEX: 26.93 KG/M2

## 2023-04-18 DIAGNOSIS — G14 POST-POLIO SYNDROME (H): ICD-10-CM

## 2023-04-18 DIAGNOSIS — M17.12 ARTHRITIS OF LEFT KNEE: Primary | ICD-10-CM

## 2023-04-18 PROCEDURE — 99214 OFFICE O/P EST MOD 30 MIN: CPT | Mod: 24 | Performed by: FAMILY MEDICINE

## 2023-04-18 PROCEDURE — G0463 HOSPITAL OUTPT CLINIC VISIT: HCPCS | Mod: 25

## 2023-04-18 PROCEDURE — 20610 DRAIN/INJ JOINT/BURSA W/O US: CPT | Performed by: FAMILY MEDICINE

## 2023-04-18 PROCEDURE — 250N000011 HC RX IP 250 OP 636: Performed by: FAMILY MEDICINE

## 2023-04-18 PROCEDURE — 250N000009 HC RX 250: Performed by: FAMILY MEDICINE

## 2023-04-18 RX ORDER — TRIAMCINOLONE ACETONIDE 40 MG/ML
40 INJECTION, SUSPENSION INTRA-ARTICULAR; INTRAMUSCULAR ONCE
Status: COMPLETED | OUTPATIENT
Start: 2023-04-18 | End: 2023-04-18

## 2023-04-18 RX ORDER — BUPIVACAINE HYDROCHLORIDE 5 MG/ML
2 INJECTION, SOLUTION EPIDURAL; INTRACAUDAL ONCE
Status: COMPLETED | OUTPATIENT
Start: 2023-04-18 | End: 2023-04-18

## 2023-04-18 RX ORDER — LIDOCAINE HYDROCHLORIDE 10 MG/ML
2 INJECTION, SOLUTION EPIDURAL; INFILTRATION; INTRACAUDAL; PERINEURAL ONCE
Status: COMPLETED | OUTPATIENT
Start: 2023-04-18 | End: 2023-04-18

## 2023-04-18 RX ADMIN — BUPIVACAINE HYDROCHLORIDE 10 MG: 5 INJECTION, SOLUTION EPIDURAL; INTRACAUDAL; PERINEURAL at 09:55

## 2023-04-18 RX ADMIN — TRIAMCINOLONE ACETONIDE 40 MG: 40 INJECTION, SUSPENSION INTRA-ARTICULAR; INTRAMUSCULAR at 09:55

## 2023-04-18 RX ADMIN — LIDOCAINE HYDROCHLORIDE 2 ML: 10 INJECTION, SOLUTION INFILTRATION; PERINEURAL at 09:55

## 2023-04-18 ASSESSMENT — PAIN SCALES - GENERAL: PAINLEVEL: WORST PAIN (10)

## 2023-04-18 NOTE — PROGRESS NOTES
Sports Medicine Office Note    HPI:  77-year-old male coming in for follow-up evaluation of left knee pain.  He has known knee arthritis.  He was last seen in this office in December 2022.  At that time he received an injection into his knee.  He reports approximately 3 months of significant symptom relief.  Over the last several weeks his pain has returned.  He currently rates his pain at 10/10.  He characterizes the pain as achy.  He has been using Celebrex to help with his symptoms.  No new injuries.      EXAM:  /74 (BP Location: Right arm, Patient Position: Sitting, Cuff Size: Adult Regular)   Pulse 86   Temp 97.1  F (36.2  C) (Temporal)   Resp 16   Wt 68.9 kg (152 lb)   SpO2 96%   BMI 26.93 kg/m    Musculoskeletal exam: Left knee  - No gross deformity  - No bruising or soft tissue swelling  - Normal skin temperature without cyanosis  - Mild joint line tenderness  - No effusion      IMAGING:  10/11/2021: 4 view left knee x-ray  -No fracture, dislocation, or bony lesion.  Moderate degenerative changes with associated osteophytosis in the medial tibiofemoral compartment.  Left knee more elevated than right.  Significant size discrepancy of the musculature about the right and left lower extremities.      ASSESSMENT/PLAN:  Diagnoses and all orders for this visit:  Arthritis of left knee  -     triamcinolone (KENALOG-40) injection 40 mg  -     lidocaine (PF) (XYLOCAINE) 1 % injection 2 mL  -     Bupivacaine 0.5% 2mL Intra-articular  -     DRAIN/INJECT LARGE JOINT/BURSA  Post-polio syndrome    77-year-old male with left knee arthritis.  X-rays from October 2021 were again personally reviewed in the office with the findings as demonstrated above by my interpretation.  Treatment options include aerobic activity, PT, topical medications, oral medications, injections, and surgical referral.  After reviewing the risks/benefits, the patient elects to proceed with a repeat CSI.  See procedure note  below:    PROCEDURE:  Intraarticular Injection of the Left Knee     PROCEDURAL PAUSE:    A procedural pause was conducted to verify:  correct patient identity, procedure to be performed, and as applicable, correct side, site, and correct patient position.      INFORMED CONSENT:   I discussed the risks, possible benefits, and alternatives to injection.  Following denial of allergy and review of potential side effects and complications (including but not necessarily limited to infection, allergic reaction, fat necrosis, skin depigmentation, local tissue breakdown, systemic effects of corticosteroids, elevation of blood glucose, injury to soft tissue and/or nerves, and seizure), all questions were answered, and consent was given to proceed.  Patient verbalized understanding.      PROCEDURE DETAILS:    Side and site were marked, and a time out was performed to verify appropriate patient identifiers.  Following this the left knee, just superior to the tibial plateau and medial to the patellar tendon, was prepped with chlorhexidine.  Utilizing a 22-gauge needle the left intraarticular knee was injected using a anteromedial to posterolateral approach with 2mL of 1% Lidocaine, 2mL of 0.5% bupivacaine, and 1mL triamcinolone (40mg/mL).  0mL was wasted.      The patient tolerated the procedure without complication and was discharged in good condition after a short observation period.  The patient was instructed to contact me regarding any questions pertaining to the procedure.      DIAGNOSIS:    -Successful injection of the left intraarticular knee without immediate complication      PLAN:   -Post-procedure care reviewed, including avoiding submersion of the injection site for 48 hours   -Return precautions reviewed for signs/symptoms that would be concerning for infection   -The patient was instructed to ice and take APAP this evening if needed   -Continue Celebrex as previously prescribed  -Consider PT in the future to  improve functionality of the joint  -Return to clinic as needed      Maynor Valadez MD  4/18/2023  9:32 AM    Total time spent with this patient was 26 minutes which included chart review, visualization and independent interpretation of images, time spent with the patient, and documentation.    Procedure time:  3 minute(s)

## 2023-04-18 NOTE — NURSING NOTE
"Chief Complaint   Patient presents with     Follow Up     Repeat left knee CSI     Patient presents for repeat left knee CSI. Last injection: 12/26/22 that lasted until the end of March 2023. Pain 10/10.    Initial /74 (BP Location: Right arm, Patient Position: Sitting, Cuff Size: Adult Regular)   Pulse 86   Temp 97.1  F (36.2  C) (Temporal)   Resp 16   Wt 68.9 kg (152 lb)   SpO2 96%   BMI 26.93 kg/m   Estimated body mass index is 26.93 kg/m  as calculated from the following:    Height as of 11/2/22: 1.6 m (5' 2.99\").    Weight as of this encounter: 68.9 kg (152 lb).       Medication Reconciliation: Raj Delacruz LPN .......  4/18/2023  9:27 AM   "

## 2023-06-02 ENCOUNTER — HOSPITAL ENCOUNTER (OUTPATIENT)
Dept: GENERAL RADIOLOGY | Facility: OTHER | Age: 78
Discharge: HOME | End: 2023-06-02
Attending: SPECIALIST
Payer: COMMERCIAL

## 2023-06-02 ENCOUNTER — OFFICE VISIT (OUTPATIENT)
Dept: ORTHOPEDICS | Facility: OTHER | Age: 78
End: 2023-06-02
Attending: SPECIALIST
Payer: COMMERCIAL

## 2023-06-02 VITALS
SYSTOLIC BLOOD PRESSURE: 134 MMHG | WEIGHT: 149 LBS | HEART RATE: 82 BPM | DIASTOLIC BLOOD PRESSURE: 70 MMHG | BODY MASS INDEX: 26.4 KG/M2 | OXYGEN SATURATION: 96 %

## 2023-06-02 DIAGNOSIS — M25.562 CHRONIC PAIN OF LEFT KNEE: ICD-10-CM

## 2023-06-02 DIAGNOSIS — M25.562 CHRONIC PAIN OF LEFT KNEE: Primary | Chronic | ICD-10-CM

## 2023-06-02 DIAGNOSIS — G89.29 CHRONIC PAIN OF LEFT KNEE: ICD-10-CM

## 2023-06-02 DIAGNOSIS — G89.29 CHRONIC PAIN OF LEFT KNEE: Primary | Chronic | ICD-10-CM

## 2023-06-02 PROCEDURE — 73560 X-RAY EXAM OF KNEE 1 OR 2: CPT | Mod: RT

## 2023-06-02 PROCEDURE — 99204 OFFICE O/P NEW MOD 45 MIN: CPT | Performed by: SPECIALIST

## 2023-06-02 PROCEDURE — G0463 HOSPITAL OUTPT CLINIC VISIT: HCPCS

## 2023-06-02 ASSESSMENT — PAIN SCALES - GENERAL: PAINLEVEL: SEVERE PAIN (7)

## 2023-06-02 NOTE — PROGRESS NOTES
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  I am seeing him today for evaluation of left knee pain.  He has been having problems with his left knee for a number of years.  He estimates at least 5.  He underwent injections and for a number of years these helped.  More recently, the injections have not given him significant relief.  The patient feels that he can walk less and less.  Celebrex was effective, it has become less effective.  A cortisone shot lasted about 4 weeks.    PHYSICAL EXAMINATION:  Today reveals a 77-year-old male.  He is alert and oriented x 3.  He is well groomed and well kempt.  Examination of both lower extremities while standing shows a varus deformity on the left.  He has medial joint line tenderness.  He does have a leg length discrepancy.  There is a definite flexion contracture of the left knee.  His right leg is weak secondary to previous polio.  Knee shows full extension and flexion 110 degrees.    IMAGING:  X-rays reviewed, standing AP views of both knees dated 2023 as well as slightly flexed PA views, a lateral view of the symptomatic left knee and bilateral merchant views.    The patient's plain film radiographs reveal degenerative changes on the left.    IMPRESSION:  Degenerative arthrosis, left knee.    We had a long discussion regarding options.  At this point, he is interested in proceeding with a total knee arthroplasty.  Risks, complications, and benefits were reviewed.  This can be scheduled at his convenience.    Gomez Lemos MD        D: 2023   T: 2023   MT: renzo    Name:     GERMAIN NAIK  MRN:      [id number]        Account:    [id number]   :      1945           Visit Date: 2023     Document: [id number]

## 2023-06-02 NOTE — PROGRESS NOTES
Patient is here for consult on his left knee pain.  Lory Maldonado LPN .....................6/2/2023 8:15 AM

## 2023-06-27 ENCOUNTER — OFFICE VISIT (OUTPATIENT)
Dept: FAMILY MEDICINE | Facility: OTHER | Age: 78
End: 2023-06-27
Attending: FAMILY MEDICINE
Payer: COMMERCIAL

## 2023-06-27 VITALS
OXYGEN SATURATION: 97 % | TEMPERATURE: 98.6 F | HEART RATE: 70 BPM | BODY MASS INDEX: 24.92 KG/M2 | HEIGHT: 64 IN | DIASTOLIC BLOOD PRESSURE: 64 MMHG | SYSTOLIC BLOOD PRESSURE: 128 MMHG | RESPIRATION RATE: 20 BRPM | WEIGHT: 146 LBS

## 2023-06-27 DIAGNOSIS — M17.12 PRIMARY OSTEOARTHRITIS OF LEFT KNEE: ICD-10-CM

## 2023-06-27 DIAGNOSIS — Z01.818 PREOP GENERAL PHYSICAL EXAM: Primary | ICD-10-CM

## 2023-06-27 DIAGNOSIS — I10 ESSENTIAL HYPERTENSION: ICD-10-CM

## 2023-06-27 LAB
ANION GAP SERPL CALCULATED.3IONS-SCNC: 9 MMOL/L (ref 7–15)
BASOPHILS # BLD AUTO: 0.1 10E3/UL (ref 0–0.2)
BASOPHILS NFR BLD AUTO: 1 %
BUN SERPL-MCNC: 18.1 MG/DL (ref 8–23)
CALCIUM SERPL-MCNC: 9.1 MG/DL (ref 8.8–10.2)
CHLORIDE SERPL-SCNC: 108 MMOL/L (ref 98–107)
CREAT SERPL-MCNC: 0.94 MG/DL (ref 0.67–1.17)
DEPRECATED HCO3 PLAS-SCNC: 26 MMOL/L (ref 22–29)
EOSINOPHIL # BLD AUTO: 0.2 10E3/UL (ref 0–0.7)
EOSINOPHIL NFR BLD AUTO: 2 %
ERYTHROCYTE [DISTWIDTH] IN BLOOD BY AUTOMATED COUNT: 13 % (ref 10–15)
GFR SERPL CREATININE-BSD FRML MDRD: 85 ML/MIN/1.73M2
GLUCOSE SERPL-MCNC: 112 MG/DL (ref 70–99)
HCT VFR BLD AUTO: 39.5 % (ref 40–53)
HGB BLD-MCNC: 13.8 G/DL (ref 13.3–17.7)
HOLD SPECIMEN: NORMAL
IMM GRANULOCYTES # BLD: 0 10E3/UL
IMM GRANULOCYTES NFR BLD: 0 %
LYMPHOCYTES # BLD AUTO: 1.6 10E3/UL (ref 0.8–5.3)
LYMPHOCYTES NFR BLD AUTO: 25 %
MCH RBC QN AUTO: 30.2 PG (ref 26.5–33)
MCHC RBC AUTO-ENTMCNC: 34.9 G/DL (ref 31.5–36.5)
MCV RBC AUTO: 86 FL (ref 78–100)
MONOCYTES # BLD AUTO: 0.4 10E3/UL (ref 0–1.3)
MONOCYTES NFR BLD AUTO: 6 %
NEUTROPHILS # BLD AUTO: 4.2 10E3/UL (ref 1.6–8.3)
NEUTROPHILS NFR BLD AUTO: 66 %
NRBC # BLD AUTO: 0 10E3/UL
NRBC BLD AUTO-RTO: 0 /100
PLATELET # BLD AUTO: 207 10E3/UL (ref 150–450)
POTASSIUM SERPL-SCNC: 3.8 MMOL/L (ref 3.4–5.3)
RBC # BLD AUTO: 4.57 10E6/UL (ref 4.4–5.9)
SODIUM SERPL-SCNC: 143 MMOL/L (ref 136–145)
WBC # BLD AUTO: 6.5 10E3/UL (ref 4–11)

## 2023-06-27 PROCEDURE — 82310 ASSAY OF CALCIUM: CPT | Mod: ZL | Performed by: FAMILY MEDICINE

## 2023-06-27 PROCEDURE — 99214 OFFICE O/P EST MOD 30 MIN: CPT | Performed by: FAMILY MEDICINE

## 2023-06-27 PROCEDURE — 85025 COMPLETE CBC W/AUTO DIFF WBC: CPT | Mod: ZL | Performed by: FAMILY MEDICINE

## 2023-06-27 PROCEDURE — G0463 HOSPITAL OUTPT CLINIC VISIT: HCPCS

## 2023-06-27 PROCEDURE — 93010 ELECTROCARDIOGRAM REPORT: CPT | Performed by: INTERNAL MEDICINE

## 2023-06-27 PROCEDURE — 93005 ELECTROCARDIOGRAM TRACING: CPT | Performed by: FAMILY MEDICINE

## 2023-06-27 PROCEDURE — G0463 HOSPITAL OUTPT CLINIC VISIT: HCPCS | Mod: 25

## 2023-06-27 PROCEDURE — 36415 COLL VENOUS BLD VENIPUNCTURE: CPT | Mod: ZL | Performed by: FAMILY MEDICINE

## 2023-06-27 ASSESSMENT — PAIN SCALES - GENERAL: PAINLEVEL: SEVERE PAIN (7)

## 2023-06-27 NOTE — NURSING NOTE
Patient here for preop for left total knee with  on 07/05/2023 in [facility]. Medication Reconciliation: complete.    Ernestine Monsivais LPN  6/27/2023 9:10 AM

## 2023-06-27 NOTE — LETTER
June 28, 2023      Mayo Madera  [address] [address]        Dear ,    We are writing to inform you of your test results.    Your test results fall within the expected range(s) or remain unchanged from previous results.  Please continue with current treatment plan.    Resulted Orders   Basic Metabolic Panel   Result Value Ref Range    Sodium 143 136 - 145 mmol/L    Potassium 3.8 3.4 - 5.3 mmol/L    Chloride 108 (H) 98 - 107 mmol/L    Carbon Dioxide (CO2) 26 22 - 29 mmol/L    Anion Gap 9 7 - 15 mmol/L    Urea Nitrogen 18.1 8.0 - 23.0 mg/dL    Creatinine 0.94 0.67 - 1.17 mg/dL    Calcium 9.1 8.8 - 10.2 mg/dL    Glucose 112 (H) 70 - 99 mg/dL    GFR Estimate 85 >60 mL/min/1.73m2   CBC with platelets and differential   Result Value Ref Range    WBC Count 6.5 4.0 - 11.0 10e3/uL    RBC Count 4.57 4.40 - 5.90 10e6/uL    Hemoglobin 13.8 13.3 - 17.7 g/dL    Hematocrit 39.5 (L) 40.0 - 53.0 %    MCV 86 78 - 100 fL    MCH 30.2 26.5 - 33.0 pg    MCHC 34.9 31.5 - 36.5 g/dL    RDW 13.0 10.0 - 15.0 %    Platelet Count 207 150 - 450 10e3/uL    % Neutrophils 66 %    % Lymphocytes 25 %    % Monocytes 6 %    % Eosinophils 2 %    % Basophils 1 %    % Immature Granulocytes 0 %    NRBCs per 100 WBC 0 <1 /100    Absolute Neutrophils 4.2 1.6 - 8.3 10e3/uL    Absolute Lymphocytes 1.6 0.8 - 5.3 10e3/uL    Absolute Monocytes 0.4 0.0 - 1.3 10e3/uL    Absolute Eosinophils 0.2 0.0 - 0.7 10e3/uL    Absolute Basophils 0.1 0.0 - 0.2 10e3/uL    Absolute Immature Granulocytes 0.0 <=0.4 10e3/uL    Absolute NRBCs 0.0 10e3/uL       If you have any questions or concerns, please call the clinic at the number listed above.       Sincerely,      Jay Murcia MD

## 2023-06-27 NOTE — PROGRESS NOTES
[facility] AND [facility]  [address]  [place] [address]  Phone: [phone]  Fax: [phone]  Primary Provider: Esequiel Geller  Pre-op Performing Provider: KIMMY MENDOZA      PREOPERATIVE EVALUATION:  Today's date: 6/27/2023    Mayo Madera is a 77 year old male who presents for a preoperative evaluation.       No data to display              Surgical Information:  Surgery/Procedure: total left knee   Surgery Location: [facility]   Surgeon:    Surgery Date: 07/05/2023  Time of Surgery: TBD  Where patient plans to recover: At home with family  Fax number for surgical facility: [phone]    Assessment & Plan     The proposed surgical procedure is considered INTERMEDIATE risk.    Preop general physical exam    - EKG 12-lead, tracing only (Same Day)    Primary osteoarthritis of left knee      Essential hypertension                - No identified additional risk factors other than previously addressed    Antiplatelet or Anticoagulation Medication Instructions:  Hold ibuprofen    Additional Medication Instructions:  Hold morning meds    RECOMMENDATION:  APPROVAL GIVEN to proceed with proposed procedure, without further diagnostic evaluation.            Subjective       HPI related to upcoming procedure: Patient arrives here for preop.  He is scheduled to undergo knee replacement July 5.  Surgery will be done at [facility].  Patient reports his knee is bone-on-bone.  He has chronic pain and has for years.        6/27/2023     9:02 AM   Preop Questions   1. Have you ever had a heart attack or stroke? No   2. Have you ever had surgery on your heart or blood vessels, such as a stent placement, a coronary artery bypass, or surgery on an artery in your head, neck, heart, or legs? No   3. Do you have chest pain with activity? No   4. Do you have a history of  heart failure? No   5. Do you currently have a cold, bronchitis or symptoms of other infection? No   6. Do you have a  cough, shortness of breath, or wheezing? No   7. Do you or anyone in your family have previous history of blood clots? No   8. Do you or does anyone in your family have a serious bleeding problem such as prolonged bleeding following surgeries or cuts? No   9. Have you ever had problems with anemia or been told to take iron pills? No   10. Have you had any abnormal blood loss such as black, tarry or bloody stools? No   11. Have you ever had a blood transfusion? No   12. Are you willing to have a blood transfusion if it is medically needed before, during, or after your surgery? Yes   13. Have you or any of your relatives ever had problems with anesthesia? No   14. Do you have sleep apnea, excessive snoring or daytime drowsiness? No   15. Do you have any artifical heart valves or other implanted medical devices like a pacemaker, defibrillator, or continuous glucose monitor? No   16. Do you have artificial joints? No   17. Are you allergic to latex? No       Health Care Directive:  Patient does not have a Health Care Directive or Living Will: Patient states has Advance Directive and will bring in a copy to clinic.    Preoperative Review of :   reviewed - no record of controlled substances prescribed.          Review of Systems  CONSTITUTIONAL: NEGATIVE for fever, chills, change in weight  INTEGUMENTARY/SKIN: NEGATIVE for worrisome rashes, moles or lesions  EYES: NEGATIVE for vision changes or irritation  ENT/MOUTH: NEGATIVE for ear, mouth and throat problems  RESP: NEGATIVE for significant cough or SOB  CV: NEGATIVE for chest pain, palpitations or peripheral edema  GI: NEGATIVE for nausea, abdominal pain, heartburn, or change in bowel habits  : NEGATIVE for frequency, dysuria, or hematuria  MUSCULOSKELETAL: NEGATIVE for significant arthralgias or myalgia  NEURO: NEGATIVE for weakness, dizziness or paresthesias  ENDOCRINE: NEGATIVE for temperature intolerance, skin/hair changes  HEME: NEGATIVE for bleeding  problems  PSYCHIATRIC: NEGATIVE for changes in mood or affect    Patient Active Problem List    Diagnosis Date Noted     Other hyperlipidemia 06/02/2021     Priority: Medium     Sigmoid diverticulosis 04/17/2019     Priority: Medium     Psychosexual dysfunction with inhibited sexual excitement 02/12/2018     Priority: Medium     Poliomyelitis 02/12/2018     Priority: Medium     Anxiety disorder, unspecified type 03/28/2016     Priority: Medium     H/O adenomatous polyp of colon 03/28/2016     Priority: Medium     Essential hypertension 04/25/2011     Priority: Medium      Past Medical History:   Diagnosis Date     Acute poliomyelitis     No Comments Provided     Anxiety disorder     02/07     Basal cell carcinoma of skin     1/8/2015     Benign essential hypertension      Decreased libido     No Comments Provided     Diverticulosis of large intestine without perforation or abscess without bleeding     3/23/2015     Enlarged prostate without lower urinary tract symptoms (luts)     02/07,Mild prostatism     Major depressive disorder, single episode     02/07     Mixed hyperlipidemia      Tubular adenoma of colon      Uncomplicated alcohol dependence (H)     Quit 2002     Past Surgical History:   Procedure Laterality Date     ANKLE SURGERY Right     For polio, age 14     ARTHROSCOPY SHOULDER Bilateral      COLONOSCOPY      02/04/2003,normal     COLONOSCOPY      3/23/15,F/U 12 months     COLONOSCOPY  04/01/2016 4/1/16,F/U 2019     COLONOSCOPY  04/22/2019    F/U 2022 advanced tubular adenoma cecum     COLONOSCOPY N/A 05/23/2022    F/U 2027 previous advanced adenoma     OPEN REDUCTION INTERNAL FIXATION WRIST Left      Current Outpatient Medications   Medication Sig Dispense Refill     buPROPion (WELLBUTRIN XL) 300 MG 24 hr tablet TAKE 1 TABLET(300 MG) BY MOUTH DAILY 90 tablet 2     celecoxib (CELEBREX) 200 MG capsule TAKE 1 CAPSULE(200 MG) BY MOUTH DAILY WITH FOOD 90 capsule 1     ibuprofen (ADVIL/MOTRIN) 200 MG tablet  "Take 200 mg by mouth every 4 hours as needed for mild pain       lisinopril-hydrochlorothiazide (ZESTORETIC) 10-12.5 MG tablet TAKE 1 TABLET BY MOUTH DAILY 90 tablet 2     Multiple Vitamins-Minerals (ONE-A-DAY MENS 50+ ADVANTAGE) TABS Take 1 tablet by mouth daily         Allergies   Allergen Reactions     Banana Unknown     Bee Venom Unknown     Kiwi Unknown     Nuts Unknown     Prunus Persica Unknown     Can't eat fresh peach        Social History     Tobacco Use     Smoking status: Former     Packs/day: 0.50     Years: 10.00     Pack years: 5.00     Types: Cigarettes     Quit date: 2006     Years since quittin.0     Smokeless tobacco: Never   Substance Use Topics     Alcohol use: No     Comment: None since      Family History   Problem Relation Age of Onset     Arthritis Mother      Heart Disease Father         Heart Disease, at age 66 of peripheral vascular disease with BK amputations bilaterally.  He was not diabetic, but had high cholesterol and was a smoker.     Family History Negative Other         Good Health,13 siblings, all A & W.  Several treated for depression.     Breast Cancer Sister      Heart Disease Brother         MI      Heart Disease Sister         Valve surgery     Heart Disease Brother         CABG     Anesthesia Reaction No family hx of      History   Drug Use No     Comment: Drug use: No         Objective     /64   Pulse 70   Temp 98.6  F (37  C)   Resp 20   Ht 1.626 m (5' 4\")   Wt 66.2 kg (146 lb)   SpO2 97%   BMI 25.06 kg/m      Physical Exam    GENERAL APPEARANCE: healthy, alert and no distress     EYES: EOMI,  PERRL     HENT: ear canals and TM's normal and nose and mouth without ulcers or lesions     NECK: no adenopathy, no asymmetry, masses, or scars and thyroid normal to palpation     RESP: lungs clear to auscultation - no rales, rhonchi or wheezes     CV: regular rates and rhythm, normal S1 S2, no S3 or S4 and no murmur, click or rub     ABDOMEN:  soft, " nontender, no HSM or masses and bowel sounds normal     MS: Degenerative changes in his left knee     SKIN: no suspicious lesions or rashes     NEURO: Normal strength and tone, sensory exam grossly normal, mentation intact and speech normal     PSYCH: mentation appears normal. and affect normal/bright       Recent Labs   Lab Test 11/02/22  1011      POTASSIUM 3.9   CR 0.91   A1C 5.6      Results for orders placed or performed in visit on 06/27/23   Basic Metabolic Panel     Status: Abnormal   Result Value Ref Range    Sodium 143 136 - 145 mmol/L    Potassium 3.8 3.4 - 5.3 mmol/L    Chloride 108 (H) 98 - 107 mmol/L    Carbon Dioxide (CO2) 26 22 - 29 mmol/L    Anion Gap 9 7 - 15 mmol/L    Urea Nitrogen 18.1 8.0 - 23.0 mg/dL    Creatinine 0.94 0.67 - 1.17 mg/dL    Calcium 9.1 8.8 - 10.2 mg/dL    Glucose 112 (H) 70 - 99 mg/dL    GFR Estimate 85 >60 mL/min/1.73m2   Extra Tube     Status: None    Narrative    The following orders were created for panel order Extra Tube.  Procedure                               Abnormality         Status                     ---------                               -----------         ------                     Extra Serum Separator Tu...[[id number]]                      Final result                 Please view results for these tests on the individual orders.   CBC with platelets and differential     Status: Abnormal   Result Value Ref Range    WBC Count 6.5 4.0 - 11.0 10e3/uL    RBC Count 4.57 4.40 - 5.90 10e6/uL    Hemoglobin 13.8 13.3 - 17.7 g/dL    Hematocrit 39.5 (L) 40.0 - 53.0 %    MCV 86 78 - 100 fL    MCH 30.2 26.5 - 33.0 pg    MCHC 34.9 31.5 - 36.5 g/dL    RDW 13.0 10.0 - 15.0 %    Platelet Count 207 150 - 450 10e3/uL    % Neutrophils 66 %    % Lymphocytes 25 %    % Monocytes 6 %    % Eosinophils 2 %    % Basophils 1 %    % Immature Granulocytes 0 %    NRBCs per 100 WBC 0 <1 /100    Absolute Neutrophils 4.2 1.6 - 8.3 10e3/uL    Absolute Lymphocytes 1.6 0.8 - 5.3 10e3/uL     Absolute Monocytes 0.4 0.0 - 1.3 10e3/uL    Absolute Eosinophils 0.2 0.0 - 0.7 10e3/uL    Absolute Basophils 0.1 0.0 - 0.2 10e3/uL    Absolute Immature Granulocytes 0.0 <=0.4 10e3/uL    Absolute NRBCs 0.0 10e3/uL   Extra Serum Separator Tube (SST)     Status: None   Result Value Ref Range    Hold Specimen [facility]    EKG 12-lead, tracing only (Same Day)     Status: None (Preliminary result)   Result Value Ref Range    Systolic Blood Pressure  mmHg    Diastolic Blood Pressure  mmHg    Ventricular Rate 80 BPM    Atrial Rate 80 BPM    OH Interval 150 ms    QRS Duration 74 ms     ms    QTc 468 ms    P Axis 74 degrees    R AXIS 73 degrees    T Axis 71 degrees    Interpretation ECG       Sinus rhythm with sinus arrhythmia with occasional Premature ventricular complexes  Nonspecific ST and T wave abnormality  Prolonged QT  Abnormal ECG  No previous ECGs available     CBC and Differential     Status: Abnormal    Narrative    The following orders were created for panel order CBC and Differential.  Procedure                               Abnormality         Status                     ---------                               -----------         ------                     CBC with platelets and d...[[id number]]  Abnormal            Final result                 Please view results for these tests on the individual orders.     Results for orders placed or performed in visit on 06/27/23   Basic Metabolic Panel     Status: Abnormal   Result Value Ref Range    Sodium 143 136 - 145 mmol/L    Potassium 3.8 3.4 - 5.3 mmol/L    Chloride 108 (H) 98 - 107 mmol/L    Carbon Dioxide (CO2) 26 22 - 29 mmol/L    Anion Gap 9 7 - 15 mmol/L    Urea Nitrogen 18.1 8.0 - 23.0 mg/dL    Creatinine 0.94 0.67 - 1.17 mg/dL    Calcium 9.1 8.8 - 10.2 mg/dL    Glucose 112 (H) 70 - 99 mg/dL    GFR Estimate 85 >60 mL/min/1.73m2   Extra Tube     Status: None    Narrative    The following orders were created for panel order Extra Tube.  Procedure                                Abnormality         Status                     ---------                               -----------         ------                     Extra Serum Separator Tu...[[id number]]                      Final result                 Please view results for these tests on the individual orders.   CBC with platelets and differential     Status: Abnormal   Result Value Ref Range    WBC Count 6.5 4.0 - 11.0 10e3/uL    RBC Count 4.57 4.40 - 5.90 10e6/uL    Hemoglobin 13.8 13.3 - 17.7 g/dL    Hematocrit 39.5 (L) 40.0 - 53.0 %    MCV 86 78 - 100 fL    MCH 30.2 26.5 - 33.0 pg    MCHC 34.9 31.5 - 36.5 g/dL    RDW 13.0 10.0 - 15.0 %    Platelet Count 207 150 - 450 10e3/uL    % Neutrophils 66 %    % Lymphocytes 25 %    % Monocytes 6 %    % Eosinophils 2 %    % Basophils 1 %    % Immature Granulocytes 0 %    NRBCs per 100 WBC 0 <1 /100    Absolute Neutrophils 4.2 1.6 - 8.3 10e3/uL    Absolute Lymphocytes 1.6 0.8 - 5.3 10e3/uL    Absolute Monocytes 0.4 0.0 - 1.3 10e3/uL    Absolute Eosinophils 0.2 0.0 - 0.7 10e3/uL    Absolute Basophils 0.1 0.0 - 0.2 10e3/uL    Absolute Immature Granulocytes 0.0 <=0.4 10e3/uL    Absolute NRBCs 0.0 10e3/uL   Extra Serum Separator Tube (SST)     Status: None   Result Value Ref Range    Hold Specimen [facility]    EKG 12-lead, tracing only (Same Day)     Status: None (Preliminary result)   Result Value Ref Range    Systolic Blood Pressure  mmHg    Diastolic Blood Pressure  mmHg    Ventricular Rate 80 BPM    Atrial Rate 80 BPM    FL Interval 150 ms    QRS Duration 74 ms     ms    QTc 468 ms    P Axis 74 degrees    R AXIS 73 degrees    T Axis 71 degrees    Interpretation ECG       Sinus rhythm with sinus arrhythmia with occasional Premature ventricular complexes  Nonspecific ST and T wave abnormality  Prolonged QT  Abnormal ECG  No previous ECGs available     CBC and Differential     Status: Abnormal    Narrative    The following orders were created for panel order CBC and Differential.  Procedure                                Abnormality         Status                     ---------                               -----------         ------                     CBC with platelets and d...[[id number]]  Abnormal            Final result                 Please view results for these tests on the individual orders.     UA unremarkable  Diagnostics:  Labs pending at this time.  Results will be reviewed when available.   EKG: appears normal, NSR, normal axis, normal intervals, no acute ST/T changes c/w ischemia, no LVH by voltage criteria, unchanged from previous tracings    Revised Cardiac Risk Index (RCRI):  The patient has the following serious cardiovascular risks for perioperative complications:   - No serious cardiac risks = 0 points     RCRI Interpretation: 0 points: Class I (very low risk - 0.4% complication rate)           Signed Electronically by: Jay Murcia MD  Copy of this evaluation report is provided to requesting physician.

## 2023-06-28 ENCOUNTER — TELEPHONE (OUTPATIENT)
Dept: FAMILY MEDICINE | Facility: OTHER | Age: 78
End: 2023-06-28
Payer: COMMERCIAL

## 2023-06-28 ENCOUNTER — LAB (OUTPATIENT)
Dept: LAB | Facility: OTHER | Age: 78
End: 2023-06-28
Attending: FAMILY MEDICINE
Payer: COMMERCIAL

## 2023-06-28 ENCOUNTER — TRANSFERRED RECORDS (OUTPATIENT)
Dept: HEALTH INFORMATION MANAGEMENT | Facility: OTHER | Age: 78
End: 2023-06-28

## 2023-06-28 DIAGNOSIS — Z01.818 PREOP GENERAL PHYSICAL EXAM: ICD-10-CM

## 2023-06-28 LAB
ALBUMIN UR-MCNC: NEGATIVE MG/DL
APPEARANCE UR: CLEAR
BILIRUB UR QL STRIP: NEGATIVE
COLOR UR AUTO: YELLOW
GLUCOSE UR STRIP-MCNC: NEGATIVE MG/DL
HGB UR QL STRIP: NEGATIVE
KETONES UR STRIP-MCNC: NEGATIVE MG/DL
LEUKOCYTE ESTERASE UR QL STRIP: NEGATIVE
NITRATE UR QL: NEGATIVE
PH UR STRIP: 5.5 [PH] (ref 5–9)
SP GR UR STRIP: 1.01 (ref 1–1.03)
UROBILINOGEN UR STRIP-MCNC: NORMAL MG/DL

## 2023-06-28 PROCEDURE — 81003 URINALYSIS AUTO W/O SCOPE: CPT | Mod: ZL

## 2023-06-28 NOTE — TELEPHONE ENCOUNTER
Called patient letting him know that we missed a urine at preop yesterday. Transferred to the appt line to come in and leave a urine. Ernestine Monsivais LPN .......................6/28/2023  9:41 AM

## 2023-06-29 LAB
ATRIAL RATE - MUSE: 80 BPM
DIASTOLIC BLOOD PRESSURE - MUSE: NORMAL MMHG
INTERPRETATION ECG - MUSE: NORMAL
P AXIS - MUSE: 74 DEGREES
PR INTERVAL - MUSE: 150 MS
QRS DURATION - MUSE: 74 MS
QT - MUSE: 406 MS
QTC - MUSE: 468 MS
R AXIS - MUSE: 73 DEGREES
SYSTOLIC BLOOD PRESSURE - MUSE: NORMAL MMHG
T AXIS - MUSE: 71 DEGREES
VENTRICULAR RATE- MUSE: 80 BPM

## 2023-07-21 ENCOUNTER — OFFICE VISIT (OUTPATIENT)
Dept: ORTHOPEDICS | Facility: OTHER | Age: 78
End: 2023-07-21
Attending: SPECIALIST
Payer: COMMERCIAL

## 2023-07-21 DIAGNOSIS — Z96.652 S/P TKR (TOTAL KNEE REPLACEMENT) NOT USING CEMENT, LEFT: Primary | ICD-10-CM

## 2023-07-21 PROCEDURE — G0463 HOSPITAL OUTPT CLINIC VISIT: HCPCS

## 2023-07-21 PROCEDURE — 99024 POSTOP FOLLOW-UP VISIT: CPT | Performed by: SPECIALIST

## 2023-07-21 NOTE — PROGRESS NOTES
Patient is here for follow up on his left total knee.  Lory Maldonado LPN .....................7/21/2023 10:43 AM

## 2023-07-21 NOTE — PROGRESS NOTES
Visit Date: 2023    HISTORY OF PRESENT ILLNESS:  Mayo returns for followup status post left total knee arthroplasty.  Date of surgery was 2023.  He is approximately 16 days out, doing very well.    PHYSICAL EXAMINATION:  Examination today reveals the patient to be status post left total knee arthroplasty.  His incision is healing nicely.  Knee shows full extension and flexion 110 degrees.  Collateral ligaments are stable.    IMPRESSION AND PLAN:  Sixteen days status post left total knee arthroplasty, doing well.  We will continue with therapy.  We will reassess him in 4 weeks' time.  Clips were removed today.  I would like to see him in 4-6 weeks with x-rays standing views of both knees, a lateral view of the left knee and bilateral merchant views.    Gomez Lemos MD        D: 2023   T: 2023   MT: SARAH    Name:     MAYO NAIK  MRN:      [id number]        Account:    [id number]   :      1945           Visit Date: 2023     Document: [id number]

## 2023-08-10 ENCOUNTER — TRANSFERRED RECORDS (OUTPATIENT)
Dept: HEALTH INFORMATION MANAGEMENT | Facility: OTHER | Age: 78
End: 2023-08-10
Payer: COMMERCIAL

## 2023-08-17 ENCOUNTER — TRANSFERRED RECORDS (OUTPATIENT)
Dept: HEALTH INFORMATION MANAGEMENT | Facility: OTHER | Age: 78
End: 2023-08-17
Payer: COMMERCIAL

## 2023-08-17 DIAGNOSIS — Z96.652 S/P TKR (TOTAL KNEE REPLACEMENT) NOT USING CEMENT, LEFT: Primary | ICD-10-CM

## 2023-08-18 ENCOUNTER — OFFICE VISIT (OUTPATIENT)
Dept: ORTHOPEDICS | Facility: OTHER | Age: 78
End: 2023-08-18
Attending: SPECIALIST
Payer: COMMERCIAL

## 2023-08-18 ENCOUNTER — HOSPITAL ENCOUNTER (OUTPATIENT)
Dept: GENERAL RADIOLOGY | Facility: OTHER | Age: 78
Discharge: HOME | End: 2023-08-18
Attending: SPECIALIST
Payer: COMMERCIAL

## 2023-08-18 DIAGNOSIS — Z96.652 S/P TKR (TOTAL KNEE REPLACEMENT) NOT USING CEMENT, LEFT: Primary | ICD-10-CM

## 2023-08-18 DIAGNOSIS — Z96.652 S/P TKR (TOTAL KNEE REPLACEMENT) NOT USING CEMENT, LEFT: ICD-10-CM

## 2023-08-18 PROCEDURE — 99024 POSTOP FOLLOW-UP VISIT: CPT | Performed by: SPECIALIST

## 2023-08-18 PROCEDURE — 73560 X-RAY EXAM OF KNEE 1 OR 2: CPT | Mod: RT

## 2023-08-18 PROCEDURE — G0463 HOSPITAL OUTPT CLINIC VISIT: HCPCS

## 2023-08-18 NOTE — PROGRESS NOTES
Subjective:    Patient returns for follow-up 6-week status post left total knee arthroplasty.  Date of surgery was 7/5/2023.  He is very pleased.    Objective:    Physical examination today shows his incision is healed nicely range of motion 0 to 115 degrees.  Imaging:     Standing AP views of both knees lateral view of the left knee show the cup patient to be status post total knee arthroplasty on the left component position is excellent.  Assessment    ; 6 weeks status post left total knee arthroplasty doing well.    Plan:    Follow-up on an annual basis for plan for regresses sooner if any problems occur.    Gomez Lemos MD

## 2023-09-19 ENCOUNTER — TRANSFERRED RECORDS (OUTPATIENT)
Dept: HEALTH INFORMATION MANAGEMENT | Facility: OTHER | Age: 78
End: 2023-09-19
Payer: COMMERCIAL

## 2023-10-13 DIAGNOSIS — I10 ESSENTIAL HYPERTENSION: ICD-10-CM

## 2023-10-13 RX ORDER — LISINOPRIL/HYDROCHLOROTHIAZIDE 10-12.5 MG
1 TABLET ORAL DAILY
Qty: 90 TABLET | Refills: 2 | Status: SHIPPED | OUTPATIENT
Start: 2023-10-13

## 2023-10-27 DIAGNOSIS — F41.9 ANXIETY DISORDER, UNSPECIFIED TYPE: ICD-10-CM

## 2023-10-27 RX ORDER — BUPROPION HYDROCHLORIDE 300 MG/1
300 TABLET ORAL DAILY
Qty: 90 TABLET | Refills: 2 | Status: SHIPPED | OUTPATIENT
Start: 2023-10-27

## 2023-10-27 NOTE — TELEPHONE ENCOUNTER
"[facility] ([place], [place]) sent Rx request for the following:      Requested Prescriptions   Pending Prescriptions Disp Refills    buPROPion (WELLBUTRIN XL) 300 MG 24 hr tablet 90 tablet 2     Sig: Take 1 tablet (300 mg) by mouth daily       SSRIs Protocol Passed - 10/27/2023  3:12 PM        Passed - Recent (12 mo) or future (30 days) visit within the authorizing provider's specialty     Patient has had an office visit with the authorizing provider or a provider within the authorizing providers department within the previous 12 mos or has a future within next 30 days. See \"Patient Info\" tab in inbasket, or \"Choose Columns\" in Meds & Orders section of the refill encounter.              Passed - Medication is Bupropion     If the medication is Bupropion (Wellbutrin), and the patient is taking for smoking cessation; OK to refill.          Passed - Medication is active on med list        Passed - Patient is age 18 or older             Last Prescription Date:   12/29/22  Last Fill Qty/Refills:         90, R-2    Last Office Visit:              6/27/23   Future Office visit:           none    RN called [facility]'s to confirm patient's refill was complete. Patient got a partial refill on 10/18 d/t being out of stock- only received 5 pills. Early next week should have more.  RN called [id number] to see if they have any in stock. They do. RN will transfer Rx to them.     KAYLAH PIRES RN on 10/27/2023 at 3:27 PM    "

## 2023-10-27 NOTE — TELEPHONE ENCOUNTER
Reason for call: Medication or medication refill    Name of medication requested: Buprorion      How many days of medication do you have left? 1 left     What pharmacy do you use? [facility]    Preferred method for responding to this message: Telephone Call    Phone number patient can be reached at: Home number on file [phone] (home)    If we cannot reach you directly, may we leave a detailed response at the number you provided? Yes        Pinky Jackson on 10/27/2023 at 3:08 PM

## 2023-11-18 ENCOUNTER — VIRTUAL VISIT (OUTPATIENT)
Dept: FAMILY MEDICINE | Facility: OTHER | Age: 78
End: 2023-11-18
Attending: NURSE PRACTITIONER
Payer: COMMERCIAL

## 2023-11-18 DIAGNOSIS — U07.1 INFECTION DUE TO 2019 NOVEL CORONAVIRUS: Primary | ICD-10-CM

## 2023-11-18 PROCEDURE — 99441 PR PHYSICIAN TELEPHONE EVALUATION 5-10 MIN: CPT | Mod: 95 | Performed by: NURSE PRACTITIONER

## 2023-11-18 NOTE — PROGRESS NOTES
Mayo is a 78 year old who is being evaluated via a billable telephone visit.      What phone number would you like to be contacted at? [phone]  How would you like to obtain your AVS? Mail a copy    Distant Location (provider location):  On-site      1. Infection due to 2019 novel coronavirus    - nirmatrelvir and ritonavir (PAXLOVID) 300 mg/100 mg therapy pack; Take 3 tablets by mouth 2 times daily for 5 days  Dispense: 30 tablet; Refill: 0    No medication interactions noted  Normal renal function     Self quarantine for 5 days and until free fever and symptoms improving or longer if fevers or symptoms persisting.    Continue to mask for total of 10 days.    Instructed to limit movements outside of home as much as possible, social distance, mask in public spaces, and monitor closely for COVID-19 symptoms      Discussed warning signs/symptoms indicative of need to f/u  Follow up if concerns    Phone call duration: 5 minutes    Subjective   Mayo is a 78 year old, presenting for the following health issues:  Covid Concern      HPI   Patient interested in antivrial treatment for Covid illness.  No hx of known covid illness.  Covid vaccinated.    COVID-19 Symptom Review  How many days ago did these symptoms start? 2 days, positive COVID test yesterday.    Are any of the following symptoms significant for you?  New or worsening difficulty breathing? No  Worsening cough? Yes, it's a dry cough.   Fever or chills? Yes, I felt feverish or had chills.  Headache: No  Sore throat: YES  Chest pain: No  Diarrhea: No  Body aches? YES    What treatments has patient tried? Advil and Paola-selter plus.   Does patient live in a nursing home, group home, or shelter? No  Does patient have a way to get food/medications during quarantined? Yes, I have a friend or family member who can help me.          Objective    Vitals - Patient Reported  Pain Score: Severe Pain (6)  Pain Loc: Throat (AND head)      Vitals:  No vitals were obtained  today due to virtual visit.    Physical Exam   healthy, alert, no distress, and cooperative  PSYCH: Alert and oriented times 3; coherent speech, normal   rate and volume, able to articulate logical thoughts, able   to abstract reason, no tangential thoughts, no hallucinations   or delusions  His affect is normal and pleasant  RESP: No cough, no audible wheezing, able to talk in full sentences  Remainder of exam unable to be completed due to telephone visits

## 2024-04-23 DIAGNOSIS — F41.9 ANXIETY DISORDER, UNSPECIFIED TYPE: ICD-10-CM

## 2024-04-25 RX ORDER — BUPROPION HYDROCHLORIDE 300 MG/1
300 TABLET ORAL DAILY
Qty: 90 TABLET | Refills: 2 | OUTPATIENT
Start: 2024-04-25

## 2024-04-25 NOTE — TELEPHONE ENCOUNTER
[facility] [place] sent Rx request for the following:      buPROPion (WELLBUTRIN XL) 300 MG 24 hr tablet 90 tablet 2 10/27/2023 -- No   Sig - Route: Take 1 tablet (300 mg) by mouth daily - Oral   Sent to pharmacy as: buPROPion HCl ER (XL) 300 MG Oral Tablet Extended Release 24 Hour (WELLBUTRIN XL)   Class: E-Prescribe   Order: [id number]     [id number] - [place], [place] - [address]     Refused, with note to pharmacy to transfer prescription. Vani Qureshi RN .............. 4/25/2024  10:43 AM

## 2024-10-28 DIAGNOSIS — I10 ESSENTIAL HYPERTENSION: ICD-10-CM

## 2024-10-28 DIAGNOSIS — F41.9 ANXIETY DISORDER, UNSPECIFIED TYPE: ICD-10-CM

## 2024-10-30 DIAGNOSIS — I10 ESSENTIAL HYPERTENSION: ICD-10-CM

## 2024-10-30 RX ORDER — LISINOPRIL AND HYDROCHLOROTHIAZIDE 10; 12.5 MG/1; MG/1
1 TABLET ORAL DAILY
Qty: 30 TABLET | Refills: 0 | Status: SHIPPED | OUTPATIENT
Start: 2024-10-30 | End: 2024-10-31

## 2024-10-30 RX ORDER — BUPROPION HYDROCHLORIDE 300 MG/1
300 TABLET ORAL DAILY
Qty: 30 TABLET | Refills: 0 | Status: SHIPPED | OUTPATIENT
Start: 2024-10-30

## 2024-10-30 NOTE — TELEPHONE ENCOUNTER
[facility] sent Rx request for the following:      Requested Prescriptions   Pending Prescriptions Disp Refills    buPROPion (WELLBUTRIN XL) 300 MG 24 hr tablet [Pharmacy Med Name: BUPROPION XL 300MG TABLETS] 90 tablet 2     Sig: TAKE 1 TABLET BY MOUTH EVERY DAY       Rx Protocol Bupropion Failed - 10/30/2024  1:50 PM        Failed - Blood pressure on file in the past 12 months        Failed - DAISY-7 on file in last 12 months        Passed - The medication is active on the med list        Passed - Recent (12 mo) or future (90 days) visit within the authorizing provider's specialty     The patient must have completed an in-person or virtual visit within the past 12 months or has a future visit scheduled within the next 90 days with the authorizing provider s specialty.  Urgent care and e-visits do not quality as an office visit for this protocol.          Passed - Medication is indicated for associated diagnosis     Medication is associated with one or more of the following diagnoses:  Anxiety  Bipolar Disorder  Depression  Smoking Cessation  Adjustment disorder with mixed anxiety and depressed mood  Adjustment disorder with anxiety  Tobacco user  Adjustment disorder with anxious mood  Attention deficit hyperactivity disorder          Passed - Patient is age 18 or older      Last Prescription Date:   10/27/23  Last Fill Qty/Refills:         90, R-2            lisinopril-hydrochlorothiazide (ZESTORETIC) 10-12.5 MG tablet [Pharmacy Med Name: LISINOPRIL-HCTZ 10/12.5MG TABLETS] 90 tablet 2     Sig: TAKE 1 TABLET BY MOUTH DAILY       Diuretics (Including Combos) Protocol Failed - 10/30/2024  1:50 PM        Failed - Most recent blood pressure under 140/90 in past 12 months     BP Readings from Last 3 Encounters:   06/27/23 128/64   06/02/23 134/70   04/18/23 136/74       No data recorded            Failed - Has GFR on file in past 12 months and most recent value is normal        Passed - Medication is active on med  list        Passed - Medication indicated for associated diagnosis     Medication is associated with one or more of the following diagnoses:     Edema   Hypertension   Heart Failure   Meniere's Disease   Bilateral localized swelling of lower limbs   Pulmonary Hypertension          Passed - Recent (12 mo) or future (90 days) visit within the authorizing provider's specialty     The patient must have completed an in-person or virtual visit within the past 12 months or has a future visit scheduled within the next 90 days with the authorizing provider s specialty.  Urgent care and e-visits do not quality as an office visit for this protocol.          Passed - Patient is age 18 or older       ACE Inhibitors (Including Combos) Protocol Failed - 10/30/2024  1:50 PM        Failed - Most recent blood pressure under 140/90 in past 12 months- Clinicial or Patient Reported     BP Readings from Last 3 Encounters:   06/27/23 128/64   06/02/23 134/70   04/18/23 136/74       No data recorded            Failed - Most recent GFR on file in the past 12 months >30        Failed - Normal serum potassium on file in past 12 months     Recent Labs   Lab Test 06/27/23  0950   POTASSIUM 3.8             Passed - Medication is active on med list        Passed - Medication indicated for associated diagnosis     Medication is associated with one or more of the following diagnoses:     Chronic Kidney Disease (CKD)   Coronary Artery Disease (CAD)   Diabetes   Heart Failure (HF)   Hypertension (HTN)   Nephropathy   History of myocarditis   Tachycardia induced cardiomyopathy   STEMI (ST elevation myocardial infarction)   Spontaneous dissection of coronary artery   Status post percutaneous transluminal coronary angioplasty            Passed - Recent (12 mo) or future (90 days) visit within the authorizing provider's specialty     The patient must have completed an in-person or virtual visit within the past 12 months or has a future visit scheduled  within the next 90 days with the authorizing provider s specialty.  Urgent care and e-visits do not quality as an office visit for this protocol.          Passed - Patient is age 18 or older             Last Prescription Date:   10/13/23  Last Fill Qty/Refills:         90, R-2    Last Office Visit:              6/27/23 (preop - [facility])   Future Office visit:             Next 5 appointments (look out 90 days)      Nov 21, 2024 10:20 AM  (Arrive by 10:05 AM)  Annual Wellness Visit with Jay Murcia MD  [facility] ([facility] ) [address]  [place] [address]  [phone]         Unable to complete prescription refill per RN Medication Refill Policy.     Jayden Weber RN on 10/30/2024 at 1:51 PM

## 2024-10-31 RX ORDER — LISINOPRIL AND HYDROCHLOROTHIAZIDE 10; 12.5 MG/1; MG/1
1 TABLET ORAL DAILY
Qty: 90 TABLET | Refills: 0 | Status: SHIPPED | OUTPATIENT
Start: 2024-10-31

## 2024-10-31 NOTE — TELEPHONE ENCOUNTER
[facility] [place] sent Rx request for the following:    Pt is requesting a 90 day supply.     lisinopril-hydrochlorothiazide (ZESTORETIC) 10-12.5 MG tablet 30 tablet 0 10/30/2024 -- No   Sig - Route: TAKE 1 TABLET BY MOUTH DAILY - Oral     Vani Qureshi RN .............. 10/31/2024  11:18 AM

## 2024-11-18 SDOH — HEALTH STABILITY: PHYSICAL HEALTH: ON AVERAGE, HOW MANY MINUTES DO YOU ENGAGE IN EXERCISE AT THIS LEVEL?: 30 MIN

## 2024-11-18 SDOH — HEALTH STABILITY: PHYSICAL HEALTH: ON AVERAGE, HOW MANY DAYS PER WEEK DO YOU ENGAGE IN MODERATE TO STRENUOUS EXERCISE (LIKE A BRISK WALK)?: 1 DAY

## 2024-11-18 ASSESSMENT — SOCIAL DETERMINANTS OF HEALTH (SDOH): HOW OFTEN DO YOU GET TOGETHER WITH FRIENDS OR RELATIVES?: ONCE A WEEK

## 2024-11-21 ENCOUNTER — OFFICE VISIT (OUTPATIENT)
Dept: FAMILY MEDICINE | Facility: OTHER | Age: 79
End: 2024-11-21
Attending: FAMILY MEDICINE
Payer: COMMERCIAL

## 2024-11-21 VITALS
OXYGEN SATURATION: 96 % | DIASTOLIC BLOOD PRESSURE: 70 MMHG | RESPIRATION RATE: 20 BRPM | WEIGHT: 145.8 LBS | TEMPERATURE: 98.1 F | HEART RATE: 82 BPM | BODY MASS INDEX: 25.03 KG/M2 | SYSTOLIC BLOOD PRESSURE: 118 MMHG

## 2024-11-21 DIAGNOSIS — Z00.00 ENCOUNTER FOR MEDICARE ANNUAL WELLNESS EXAM: ICD-10-CM

## 2024-11-21 DIAGNOSIS — E78.49 OTHER HYPERLIPIDEMIA: ICD-10-CM

## 2024-11-21 DIAGNOSIS — I10 ESSENTIAL HYPERTENSION: ICD-10-CM

## 2024-11-21 DIAGNOSIS — D12.6 TUBULAR ADENOMA OF COLON: Primary | ICD-10-CM

## 2024-11-21 DIAGNOSIS — F41.9 ANXIETY DISORDER, UNSPECIFIED TYPE: ICD-10-CM

## 2024-11-21 LAB
ANION GAP SERPL CALCULATED.3IONS-SCNC: 9 MMOL/L (ref 7–15)
BUN SERPL-MCNC: 19.1 MG/DL (ref 8–23)
CALCIUM SERPL-MCNC: 9.1 MG/DL (ref 8.8–10.4)
CHLORIDE SERPL-SCNC: 105 MMOL/L (ref 98–107)
CHOLEST SERPL-MCNC: 201 MG/DL
CREAT SERPL-MCNC: 0.89 MG/DL (ref 0.67–1.17)
EGFRCR SERPLBLD CKD-EPI 2021: 88 ML/MIN/1.73M2
FASTING STATUS PATIENT QL REPORTED: YES
FASTING STATUS PATIENT QL REPORTED: YES
GLUCOSE SERPL-MCNC: 106 MG/DL (ref 70–99)
HCO3 SERPL-SCNC: 25 MMOL/L (ref 22–29)
HDLC SERPL-MCNC: 52 MG/DL
HOLD SPECIMEN: NORMAL
LDLC SERPL CALC-MCNC: 124 MG/DL
NONHDLC SERPL-MCNC: 149 MG/DL
POTASSIUM SERPL-SCNC: 4.3 MMOL/L (ref 3.4–5.3)
SODIUM SERPL-SCNC: 139 MMOL/L (ref 135–145)
TRIGL SERPL-MCNC: 124 MG/DL

## 2024-11-21 PROCEDURE — G0463 HOSPITAL OUTPT CLINIC VISIT: HCPCS

## 2024-11-21 PROCEDURE — 36415 COLL VENOUS BLD VENIPUNCTURE: CPT | Mod: ZL | Performed by: FAMILY MEDICINE

## 2024-11-21 PROCEDURE — 82374 ASSAY BLOOD CARBON DIOXIDE: CPT | Mod: ZL | Performed by: FAMILY MEDICINE

## 2024-11-21 PROCEDURE — 82465 ASSAY BLD/SERUM CHOLESTEROL: CPT | Mod: ZL | Performed by: FAMILY MEDICINE

## 2024-11-21 PROCEDURE — 80048 BASIC METABOLIC PNL TOTAL CA: CPT | Mod: ZL | Performed by: FAMILY MEDICINE

## 2024-11-21 RX ORDER — LISINOPRIL AND HYDROCHLOROTHIAZIDE 10; 12.5 MG/1; MG/1
1 TABLET ORAL DAILY
Qty: 90 TABLET | Refills: 4 | Status: SHIPPED | OUTPATIENT
Start: 2024-11-21

## 2024-11-21 RX ORDER — BUPROPION HYDROCHLORIDE 300 MG/1
300 TABLET ORAL DAILY
Qty: 90 TABLET | Refills: 4 | Status: SHIPPED | OUTPATIENT
Start: 2024-11-21

## 2024-11-21 ASSESSMENT — PAIN SCALES - GENERAL: PAINLEVEL_OUTOF10: NO PAIN (0)

## 2024-11-21 NOTE — PROGRESS NOTES
Preventive Care Visit  [facility]  Jay Murcia MD, Family Medicine  Nov 21, 2024      Assessment & Plan     Encounter for Medicare annual wellness exam      Anxiety disorder, unspecified type    - buPROPion (WELLBUTRIN XL) 300 MG 24 hr tablet; Take 1 tablet (300 mg) by mouth daily.    Essential hypertension    - lisinopril-hydrochlorothiazide (ZESTORETIC) 10-12.5 MG tablet; Take 1 tablet by mouth daily.  - BASIC METABOLIC PANEL; Future  - BASIC METABOLIC PANEL    Other hyperlipidemia    - Lipid Profile; Future  - Lipid Profile    Tubular adenoma of colon  Follow-up colonoscopy 2027    Results for orders placed or performed in visit on 11/21/24   Lipid Profile     Status: Abnormal   Result Value Ref Range    Cholesterol 201 (H) <200 mg/dL    Triglycerides 124 <150 mg/dL    Direct Measure HDL 52 >=40 mg/dL    LDL Cholesterol Calculated 124 (H) <100 mg/dL    Non HDL Cholesterol 149 (H) <130 mg/dL    Patient Fasting > 8hrs? Yes     Narrative    Cholesterol  Desirable: < 200 mg/dL  Borderline High: 200 - 239 mg/dL  High: >= 240 mg/dL    Triglycerides  Normal: < 150 mg/dL  Borderline High: 150 - 199 mg/dL  High: 200-499 mg/dL  Very High: >= 500 mg/dL    Direct Measure HDL  Female: >= 50 mg/dL   Male: >= 40 mg/dL    LDL Cholesterol  Desirable: < 100 mg/dL  Above Desirable: 100 - 129 mg/dL   Borderline High: 130 - 159 mg/dL   High:  160 - 189 mg/dL   Very High: >= 190 mg/dL    Non HDL Cholesterol  Desirable: < 130 mg/dL  Above Desirable: 130 - 159 mg/dL  Borderline High: 160 - 189 mg/dL  High: 190 - 219 mg/dL  Very High: >= 220 mg/dL   BASIC METABOLIC PANEL     Status: Abnormal   Result Value Ref Range    Sodium 139 135 - 145 mmol/L    Potassium 4.3 3.4 - 5.3 mmol/L    Chloride 105 98 - 107 mmol/L    Carbon Dioxide (CO2) 25 22 - 29 mmol/L    Anion Gap 9 7 - 15 mmol/L    Urea Nitrogen 19.1 8.0 - 23.0 mg/dL    Creatinine 0.89 0.67 - 1.17 mg/dL    GFR Estimate 88 >60 mL/min/1.73m2    Calcium 9.1 8.8  - 10.4 mg/dL    Glucose 106 (H) 70 - 99 mg/dL    Patient Fasting > 8hrs? Yes    Extra Tube     Status: None    Narrative    The following orders were created for panel order Extra Tube.  Procedure                               Abnormality         Status                     ---------                               -----------         ------                     Extra Purple Top Tube[[id number]]                            Final result                 Please view results for these tests on the individual orders.   Extra Purple Top Tube     Status: None   Result Value Ref Range    Hold Specimen [facility]      Lab reviewed satisfactory        Counseling  Appropriate preventive services were addressed with this patient via screening, questionnaire, or discussion as appropriate for fall prevention, nutrition, physical activity, Tobacco-use cessation, social engagement, weight loss and cognition.  Checklist reviewing preventive services available has been given to the patient.  Reviewed patient's diet, addressing concerns and/or questions.   He is at risk for lack of exercise and has been provided with information to increase physical activity for the benefit of his well-being.         No follow-ups on file.    Leah Huber is a 79 year old, presenting for the following:  Wellness Visit            HPI    Patient arrives here for Medicare wellness.  Medication renewal labs.  Chart reviewed showing patient is up-to-date for his colon screening.  Next due in 2/20/2027.  Secondary to tubular adenoma.  Immunizations are up-to-date.  Reports his medications are working well for his anxiety.  And hypertension.    Health Care Directive  Patient does not have a Health Care Directive: Discussed advance care planning with patient; however, patient declined at this time.      11/18/2024   General Health   How would you rate your overall physical health? Good   Feel stress (tense, anxious, or unable to sleep) Only a little      (!) STRESS  CONCERN      11/18/2024   Nutrition   Diet: Regular (no restrictions)            11/18/2024   Exercise   Days per week of moderate/strenous exercise 1 day   Average minutes spent exercising at this level 30 min      (!) EXERCISE CONCERN      11/18/2024   Social Factors   Frequency of gathering with friends or relatives Once a week   Worry food won't last until get money to buy more No   Food not last or not have enough money for food? No   Do you have housing? (Housing is defined as stable permanent housing and does not include staying ouside in a car, in a tent, in an abandoned building, in an overnight shelter, or couch-surfing.) Yes   Are you worried about losing your housing? No   Lack of transportation? No   Unable to get utilities (heat,electricity)? No            11/18/2024   Fall Risk   Fallen 2 or more times in the past year? No     No    Trouble with walking or balance? No     No        Patient-reported    Multiple values from one day are sorted in reverse-chronological order          11/18/2024   Activities of Daily Living- Home Safety   Needs help with the following daily activites None of the above   Safety concerns in the home None of the above            11/18/2024   Dental   Dentist two times every year? Yes            11/18/2024   Hearing Screening   Hearing concerns? None of the above            11/18/2024   Driving Risk Screening   Patient/family members have concerns about driving No            11/18/2024   General Alertness/Fatigue Screening   Have you been more tired than usual lately? No            11/18/2024   Urinary Incontinence Screening   Bothered by leaking urine in past 6 months No            11/18/2024   TB Screening   Were you born outside of the [place]? No            Today's PHQ-2 Score:       11/21/2024    10:03 AM   PHQ-2 ( 1999 [employer])   Q1: Little interest or pleasure in doing things 0    Q2: Feeling down, depressed or hopeless 0    PHQ-2 Score 0    Q1: Little interest or pleasure in  doing things Not at all   Q2: Feeling down, depressed or hopeless Not at all   PHQ-2 Score 0       Patient-reported           2024   Substance Use   Alcohol more than 3/day or more than 7/wk No   Do you have a current opioid prescription? No   How severe/bad is pain from 1 to 10? 0/10 (No Pain)   Do you use any other substances recreationally? No        Social History     Tobacco Use    Smoking status: Former     Current packs/day: 0.00     Average packs/day: 0.5 packs/day for 10.0 years (5.0 ttl pk-yrs)     Types: Cigarettes     Start date: 1996     Quit date: 2006     Years since quittin.4    Smokeless tobacco: Never   Vaping Use    Vaping status: Never Used   Substance Use Topics    Alcohol use: No     Comment: None since     Drug use: No     Comment: Drug use: No       ASCVD Risk   The 10-year ASCVD risk score (Milana JACOB, et al., 2019) is: 27.9%    Values used to calculate the score:      Age: 79 years      Sex: Male      Is Non- : No      Diabetic: No      Tobacco smoker: No      Systolic Blood Pressure: 118 mmHg      Is BP treated: Yes      HDL Cholesterol: 52 mg/dL      Total Cholesterol: 203 mg/dL            Reviewed and updated as needed this visit by Provider                      Current providers sharing in care for this patient include:  Patient Care Team:  Jay Murcia MD as PCP - General (Family Medicine)  Gomez Lemos MD as Assigned Musculoskeletal Provider  Jay Murcia MD as Assigned PCP    The following health maintenance items are reviewed in Epic and correct as of today:  Health Maintenance   Topic Date Due    DTAP/TDAP/TD IMMUNIZATION (3 - Td or Tdap) 2020    ZOSTER IMMUNIZATION (3 of 3) 2021    MEDICARE ANNUAL WELLNESS VISIT  2023    LIPID  2023    BMP  2024    FALL RISK ASSESSMENT  2025    GLUCOSE  2026    COLORECTAL CANCER SCREENING  2027    ADVANCE CARE PLANNING   "06/27/2028    HEPATITIS C SCREENING  Completed    PHQ-2 (once per calendar year)  Completed    INFLUENZA VACCINE  Completed    Pneumococcal Vaccine: 65+ Years  Completed    RSV VACCINE  Completed    COVID-19 Vaccine  Completed    HPV IMMUNIZATION  Aged Out    MENINGITIS IMMUNIZATION  Aged Out    RSV MONOCLONAL ANTIBODY  Aged Out            Objective    Exam  /70   Pulse 82   Temp 98.1  F (36.7  C)   Resp 20   Wt 66.1 kg (145 lb 12.8 oz)   SpO2 96%   BMI 25.03 kg/m     Estimated body mass index is 25.03 kg/m  as calculated from the following:    Height as of 6/27/23: 1.626 m (5' 4\").    Weight as of this encounter: 66.1 kg (145 lb 12.8 oz).    Physical Exam  GENERAL: alert and no distress  NECK: no adenopathy, no asymmetry, masses, or scars  RESP: lungs clear to auscultation - no rales, rhonchi or wheezes  CV: regular rate and rhythm, normal S1 S2, no S3 or S4, no murmur, click or rub, no peripheral edema  ABDOMEN: soft, nontender, no hepatosplenomegaly, no masses and bowel sounds normal  MS: no gross musculoskeletal defects noted, no edema        11/21/2024   Mini Cog   Clock Draw Score 2 Normal   3 Item Recall 1 object recalled   Mini Cog Total Score 3                 Signed Electronically by: Jay Murcia MD    "

## 2024-11-21 NOTE — LETTER
November 21, 2024      Mayo [place]  [address] [address]        Dear ,    We are writing to inform you of your test results.    Your test results fall within the expected range(s) or remain unchanged from previous results.  Please continue with current treatment plan.    Resulted Orders   Lipid Profile   Result Value Ref Range    Cholesterol 201 (H) <200 mg/dL    Triglycerides 124 <150 mg/dL    Direct Measure HDL 52 >=40 mg/dL    LDL Cholesterol Calculated 124 (H) <100 mg/dL    Non HDL Cholesterol 149 (H) <130 mg/dL    Patient Fasting > 8hrs? Yes     Narrative    Cholesterol  Desirable: < 200 mg/dL  Borderline High: 200 - 239 mg/dL  High: >= 240 mg/dL    Triglycerides  Normal: < 150 mg/dL  Borderline High: 150 - 199 mg/dL  High: 200-499 mg/dL  Very High: >= 500 mg/dL    Direct Measure HDL  Female: >= 50 mg/dL   Male: >= 40 mg/dL    LDL Cholesterol  Desirable: < 100 mg/dL  Above Desirable: 100 - 129 mg/dL   Borderline High: 130 - 159 mg/dL   High:  160 - 189 mg/dL   Very High: >= 190 mg/dL    Non HDL Cholesterol  Desirable: < 130 mg/dL  Above Desirable: 130 - 159 mg/dL  Borderline High: 160 - 189 mg/dL  High: 190 - 219 mg/dL  Very High: >= 220 mg/dL   BASIC METABOLIC PANEL   Result Value Ref Range    Sodium 139 135 - 145 mmol/L    Potassium 4.3 3.4 - 5.3 mmol/L    Chloride 105 98 - 107 mmol/L    Carbon Dioxide (CO2) 25 22 - 29 mmol/L    Anion Gap 9 7 - 15 mmol/L    Urea Nitrogen 19.1 8.0 - 23.0 mg/dL    Creatinine 0.89 0.67 - 1.17 mg/dL    GFR Estimate 88 >60 mL/min/1.73m2      Comment:      eGFR calculated using 2021 CKD-EPI equation.    Calcium 9.1 8.8 - 10.4 mg/dL      Comment:      Reference intervals for this test were updated on 7/16/2024 to reflect our healthy population more accurately. There may be differences in the flagging of prior results with similar values performed with this method. Those prior results can be interpreted in the context of the updated reference  intervals.    Glucose 106 (H) 70 - 99 mg/dL    Patient Fasting > 8hrs? Yes        If you have any questions or concerns, please call the clinic at the number listed above.       Sincerely,      Jay Murcia MD

## 2024-11-21 NOTE — NURSING NOTE
Patient here for annual medicare wellness. Medication Reconciliation: complete.    Ernestine Monsivais LPN  11/21/2024 10:23 AM

## (undated) DEVICE — ENDO BRUSH CHANNEL MASTER CLEANING 2-4.2MM BW-412T

## (undated) DEVICE — SUCTION MANIFOLD NEPTUNE 2 SYS 4 PORT 0702-020-000

## (undated) DEVICE — ESU GROUND PAD ADULT W/CORD E7507

## (undated) DEVICE — ESU ENDO FORCEP BX HOT FD-210U

## (undated) DEVICE — ENDO TRAP POLYP E-TRAP 00711099

## (undated) DEVICE — ENDO SNARE POLYPECTOMY OVAL 10MM LOOP SD-240U-10

## (undated) DEVICE — ENDO KIT COMPLIANCE DYKENDOCMPLY

## (undated) DEVICE — SOL WATER 1500ML

## (undated) DEVICE — TUBING SUCTION 10'X3/16" N510

## (undated) RX ORDER — LIDOCAINE HYDROCHLORIDE 20 MG/ML
INJECTION, SOLUTION EPIDURAL; INFILTRATION; INTRACAUDAL; PERINEURAL
Status: DISPENSED
Start: 2022-05-23

## (undated) RX ORDER — PROPOFOL 10 MG/ML
INJECTION, EMULSION INTRAVENOUS
Status: DISPENSED
Start: 2019-04-22

## (undated) RX ORDER — PROPOFOL 10 MG/ML
INJECTION, EMULSION INTRAVENOUS
Status: DISPENSED
Start: 2022-05-23

## (undated) RX ORDER — TRIAMCINOLONE ACETONIDE 40 MG/ML
INJECTION, SUSPENSION INTRA-ARTICULAR; INTRAMUSCULAR
Status: DISPENSED
Start: 2022-09-07

## (undated) RX ORDER — BUPIVACAINE HYDROCHLORIDE 5 MG/ML
INJECTION, SOLUTION EPIDURAL; INTRACAUDAL
Status: DISPENSED
Start: 2023-04-18

## (undated) RX ORDER — BUPIVACAINE HYDROCHLORIDE 5 MG/ML
INJECTION, SOLUTION EPIDURAL; INTRACAUDAL
Status: DISPENSED
Start: 2022-09-07

## (undated) RX ORDER — TRIAMCINOLONE ACETONIDE 40 MG/ML
INJECTION, SUSPENSION INTRA-ARTICULAR; INTRAMUSCULAR
Status: DISPENSED
Start: 2023-04-18

## (undated) RX ORDER — TRIAMCINOLONE ACETONIDE 40 MG/ML
INJECTION, SUSPENSION INTRA-ARTICULAR; INTRAMUSCULAR
Status: DISPENSED
Start: 2022-12-26

## (undated) RX ORDER — LIDOCAINE HYDROCHLORIDE 20 MG/ML
INJECTION, SOLUTION EPIDURAL; INFILTRATION; INTRACAUDAL; PERINEURAL
Status: DISPENSED
Start: 2019-04-22

## (undated) RX ORDER — LIDOCAINE HYDROCHLORIDE 10 MG/ML
INJECTION, SOLUTION INFILTRATION; PERINEURAL
Status: DISPENSED
Start: 2022-09-07

## (undated) RX ORDER — BUPIVACAINE HYDROCHLORIDE 5 MG/ML
INJECTION, SOLUTION EPIDURAL; INTRACAUDAL
Status: DISPENSED
Start: 2022-12-26